# Patient Record
Sex: MALE | Race: WHITE | Employment: FULL TIME
[De-identification: names, ages, dates, MRNs, and addresses within clinical notes are randomized per-mention and may not be internally consistent; named-entity substitution may affect disease eponyms.]

---

## 2022-07-14 ENCOUNTER — TRANSCRIBE ORDERS (OUTPATIENT)
Dept: OTHER | Age: 42
End: 2022-07-14

## 2022-07-14 DIAGNOSIS — F41.9 ANXIETY AND DEPRESSION: Primary | ICD-10-CM

## 2022-07-14 DIAGNOSIS — F32.A ANXIETY AND DEPRESSION: Primary | ICD-10-CM

## 2022-07-15 ENCOUNTER — TELEPHONE (OUTPATIENT)
Dept: NEUROPSYCHOLOGY | Facility: CLINIC | Age: 42
End: 2022-07-15

## 2022-07-15 NOTE — TELEPHONE ENCOUNTER
Spoke with Soledad at [facility].  Left message for ref prov that neuropsych referral is declined. Unfortunately, at this time our clinic does not see patients for diagnostic questions related to learning disabilities, ADHD, and/or autism spectrum disorders. In short, we do not currently perform assessments solely for the retrospective diagnosis of developmental conditions in adults.     Provided ref prov with the following alternative referral options:    - Dr. Karena [email or web address]  [phone]    - [facility] - with locations throughout the [facility] area: [phone].    - [facility] - with locations throughout the [facility] area: [phone].    - [facility] - with locations throughout the [facility] area: [phone].    Mavis Irvin  Psychometrist

## 2025-01-09 ENCOUNTER — TELEPHONE (OUTPATIENT)
Dept: UROLOGY | Facility: CLINIC | Age: 45
End: 2025-01-09

## 2025-01-09 NOTE — TELEPHONE ENCOUNTER
[facility] Call Center    Phone Message    May a detailed message be left on voicemail:Yes     Reason for Call: Other: Patient called to schedule an appointment for anorgasmia. However writer does not see DX on protocols. Please review and call patient back to schedule.     Action Taken: Message routed to:  [facility] Urology    Travel Screening: Not Applicable     Date of Service:

## 2025-01-15 NOTE — TELEPHONE ENCOUNTER
Spoke with patient. Patient needs some time to think about it as per his insurance, each provider had different co pay amounts. Patient will call back if wanting to schedule with us.

## 2025-01-30 NOTE — TELEPHONE ENCOUNTER
MEDICAL RECORDS REQUEST   [facility]  Urology Clinic  [address] [place] [address]  PHONE: [phone]  Fax: [phone]        FUTURE VISIT INFORMATION                                                   Jatin Huang, : 1980 scheduled for future visit at [facility]    APPOINTMENT INFORMATION:  Date: 3/6/25  Provider:  Abhi Hein MD   Reason for Visit/Diagnosis: anorgasmia     REFERRAL INFORMATION:  Referring provider:  Self Referred    RECORDS REQUESTED FOR VISIT                                                     NOTES  STATUS/DETAILS   OFFICE NOTE from other specialist  yes 10/8/24, 24 - Mauro Cervantes, NP - [facility]    MEDICATION LIST  yes     PRE-VISIT CHECKLIST      Joint diagnostic appointment coordinated correctly          (ensure right order & amount of time) Yes   RECORD COLLECTION COMPLETE Yes and No

## 2025-02-12 ENCOUNTER — HOSPITAL ENCOUNTER (EMERGENCY)
Facility: CLINIC | Age: 45
Discharge: HOME | End: 2025-02-12
Attending: SOCIAL WORKER | Admitting: SOCIAL WORKER
Payer: COMMERCIAL

## 2025-02-12 VITALS
HEART RATE: 83 BPM | DIASTOLIC BLOOD PRESSURE: 92 MMHG | OXYGEN SATURATION: 100 % | TEMPERATURE: 98.3 F | WEIGHT: 200.8 LBS | HEIGHT: 72 IN | SYSTOLIC BLOOD PRESSURE: 134 MMHG | RESPIRATION RATE: 16 BRPM | BODY MASS INDEX: 27.2 KG/M2

## 2025-02-12 DIAGNOSIS — F41.1 GAD (GENERALIZED ANXIETY DISORDER): ICD-10-CM

## 2025-02-12 DIAGNOSIS — F34.1 PERSISTENT DEPRESSIVE DISORDER: ICD-10-CM

## 2025-02-12 DIAGNOSIS — F33.1 MAJOR DEPRESSIVE DISORDER, RECURRENT EPISODE, MODERATE (H): ICD-10-CM

## 2025-02-12 PROBLEM — F33.2 SEVERE EPISODE OF RECURRENT MAJOR DEPRESSIVE DISORDER, WITHOUT PSYCHOTIC FEATURES (H): Status: ACTIVE | Noted: 2025-02-12

## 2025-02-12 PROCEDURE — 99283 EMERGENCY DEPT VISIT LOW MDM: CPT

## 2025-02-12 RX ORDER — EMTRICITABINE AND TENOFOVIR DISOPROXIL FUMARATE 200; 300 MG/1; MG/1
1 TABLET, FILM COATED ORAL DAILY
COMMUNITY
Start: 2024-10-15

## 2025-02-12 ASSESSMENT — ACTIVITIES OF DAILY LIVING (ADL)
ADLS_ACUITY_SCORE: 41

## 2025-02-12 ASSESSMENT — COLUMBIA-SUICIDE SEVERITY RATING SCALE - C-SSRS
2. HAVE YOU ACTUALLY HAD ANY THOUGHTS OF KILLING YOURSELF IN THE PAST MONTH?: YES
4. HAVE YOU HAD THESE THOUGHTS AND HAD SOME INTENTION OF ACTING ON THEM?: YES
6. HAVE YOU EVER DONE ANYTHING, STARTED TO DO ANYTHING, OR PREPARED TO DO ANYTHING TO END YOUR LIFE?: NO
3. HAVE YOU BEEN THINKING ABOUT HOW YOU MIGHT KILL YOURSELF?: YES
1. IN THE PAST MONTH, HAVE YOU WISHED YOU WERE DEAD OR WISHED YOU COULD GO TO SLEEP AND NOT WAKE UP?: YES
5. HAVE YOU STARTED TO WORK OUT OR WORKED OUT THE DETAILS OF HOW TO KILL YOURSELF? DO YOU INTEND TO CARRY OUT THIS PLAN?: YES

## 2025-02-12 NOTE — ED TRIAGE NOTES
Patient presents to the ER looking to go to EMPATH unit. Patient reports that he has had depression for the last 20 years but over the last couple of days has had increasing suicidal ideation, with a plan in place. Wants to go to EMPATH and contracted for safety while waiting to go      Triage Assessment (Adult)       Row Name 02/12/25 1415          Triage Assessment    Airway WDL WDL        Respiratory WDL    Respiratory WDL WDL        Skin Circulation/Temperature WDL    Skin Circulation/Temperature WDL WDL        Cardiac WDL    Cardiac WDL WDL        Peripheral/Neurovascular WDL    Peripheral Neurovascular WDL WDL        Cognitive/Neuro/Behavioral WDL    Cognitive/Neuro/Behavioral WDL X

## 2025-02-12 NOTE — PROGRESS NOTES
"44 year old male with history of depression received from ED due to increased depression and SI. Reports his SI is chronic. Has been ongoing for years, but was really bad today. Cannot identify any specific trigger. States he always has multiple plans. States he has never attempted to take his life, \"but if I did I would have been successful.\" States he has tried \"almost every antianxiety and antidepressant medication there is\" and nothing has helped. States he most recently was on Effexor and he had bad withdrawal side effects from it. States he has a history of opiate and heroine addiction, so avoids most medications due to worry of addiction and withdrawal symptoms. States for that reason, he is not open to starting or trying any medications while here. Is hoping to get a referral for ECT treatment or alternative treatment option. Denies drug or alcohol use. Denies experiencing hallucinations. Flat affect, and slightly irritable in mood, but overall cooperative with intake process.     Nursing and risk assessments completed. Assessments reviewed with LMHP and physician. Admission information reviewed with patient. Patient given a tour of [facility] and instructions on using the facility. Questions regarding [facility] addressed. Pt safety search completed.     "

## 2025-02-12 NOTE — ED NOTES
[facility]  ED to EMPATH Checklist:      Goal for EMPATH: Suicidality    Current Behavior: Calm and Cooperative    Safety Concerns: Suicidal, with a plan to    Legal Hold Status: Voluntary    Medically Cleared by ED provider: Yes    Patient Therapeutically Searched: Not searched - Currently in triage    Belongings: Remain with patient    Independent Ambulation at Baseline: Yes/No: Yes    Participates in Care/Conversation: Yes/No: Yes    Patient Informed about EMPATH: Yes/No: Yes    DEC: Ordered and pending    Patient Ready to be Transferred to EMPATH? Yes/No: Yes

## 2025-02-13 ENCOUNTER — VIRTUAL VISIT (OUTPATIENT)
Dept: PSYCHOLOGY | Facility: CLINIC | Age: 45
End: 2025-02-13
Payer: COMMERCIAL

## 2025-02-13 DIAGNOSIS — Z78.9 KNOWN HEALTH PROBLEMS: NONE: Primary | ICD-10-CM

## 2025-02-13 NOTE — ED PROVIDER NOTES
[facility] Unit - Psychiatric Consultation  [facility] Emergency Department    Jatin Huang MRN: [id number]   Age: 44 year old YOB: 1980     History     Chief Complaint   Patient presents with    Psychiatric Evaluation     HPI  Jatin Huang is a 44 year old male with history notable for depression, anxiety, and remote history of polysubstance dependence.  Patient presented to the emergency department for evaluation of worsening depression accompanied by suicidal ideation.  Patient was medically evaluated in the emergency department and determined to be medically stable for transfer to [facility] for further psychiatric assessment.  Patient is nearing 5.5 hours in emergency care.  Here at [facility], patient reports he has been struggling with depression for many years, consistent with the persistent depressive disorder.  He does have periods where he feels more significantly depressed beyond his baseline depression, accompanied by low energy, poor motivation, social withdrawal and isolation, hopelessness, and increased suicidal thinking.  He reports that he struggles with impulse control and anger outbursts, sometimes entering into verbal altercations at work or punching and kicking walls.  He has tried a variety of antidepressants, Abilify, TMS, and DBT, but has not found an effective treatment for his depression and impulsivity.  He recently has been looking into electroconvulsive therapy and is seeking a referral.  He is aware of the side effects and memory impairment associated with this.  In the interim, he was agreeable to a discussion regarding a couple of antidepressants he has not tried.  We discussed Trintellix as an option, and he was accepting of a prescription for this.  He is open to intensive outpatient therapy, stating he recently stopped attending therapy a couple of weeks ago.  Currently, he denies any suicidal plan or intent.  He experiences passive suicidal thinking at times, but feels  confident in his ability to reach out for help.  He is help seeking and agrees to return to the ED with any worsening symptoms.    Past Medical History  No past medical history on file.  No past surgical history on file.  emtricitabine-tenofovir (TRUVADA) 200-300 MG per tablet  vortioxetine (TRINTELLIX) 10 MG tablet      No Known Allergies  Family History  No family history on file.  Social History           Review of Systems  A medically appropriate review of systems was performed with pertinent positives and negatives noted in the HPI, and all other systems negative.    Physical Examination   BP: (!) 157/98  Pulse: 82  Temp: 98.7  F (37.1  C)  Resp: 20  Height: 182.9 cm (6')  Weight: 91.1 kg (200 lb 12.8 oz)  SpO2: 99 %    Physical Exam  General: Appears stated age.   Neuro: Alert and fully oriented. Extremities appear to demonstrate normal strength on visual inspection.   Integumentary/Skin: no rash visualized, normal color    Psychiatric Examination   Appearance: awake, alert, adequately groomed, appeared as age stated, and casually dressed  Attitude:  cooperative  Eye Contact:  good  Mood:  depressed  Affect:  mood congruent and intensity is blunted  Speech:  clear, coherent and normal prosody  Psychomotor Behavior:  no evidence of tardive dyskinesia, dystonia, or tics  Thought Process:  linear  Associations:  no loose associations  Thought Content:  no evidence of psychotic thought, passive suicidal ideation present, no auditory hallucinations present, and no visual hallucinations present  Insight:  fair  Judgement:  fair  Oriented to:  time, person, and place  Attention Span and Concentration:  fair  Recent and Remote Memory:  fair  Language: able to name/identify objects without impairment  Fund of Knowledge: intact with awareness of current and past events    ED Course        Labs Ordered and Resulted from Time of ED Arrival to Time of ED Departure - No data to display    Assessments & Plan (with Medical  Decision Making)   Patient presenting with symptoms of depression accompanied by suicidal thinking, seeking a referral for ECT treatment.  He is amenable to a trial of Trintellix after discussion, and will be set up with outpatient resources.  Nursing notes reviewed noting no acute issues.     I have reviewed the assessment completed by the [facility].     Preliminary diagnosis:    ICD-10-CM    1. Persistent depressive disorder  F34.1       2. Major depressive disorder, recurrent episode, moderate (H)  F33.1       3. VELMA (generalized anxiety disorder)  F41.1            Treatment Plan:  -Start Trintellix 10 mg daily for treatment of anxiety and depressive symptoms.  Provided education on mechanism of action.  -Patient declines any as needed medication  -Patient is seeking outpatient ECT and has been in contact with the [facility] ECT clinic.  He is seeking a referral and will be provided with an outpatient psychiatry appointment, where he can receive a referral for ECT.  He will also be provided with information to schedule an appointment with the treatment resistant depression clinic in [place].  -Patient will be scheduled for an intake appointment for programmatic care  -Patient able to contract for safety and is seeking discharge home at this time.  Patient to be discharged      After a period of working with the treatment team on the [facility] unit, the patient's mental state improved to allow a safe transition to outpatient care. After counseling on the diagnosis, work-up, and treatment plan, the patient was discharged. Close follow-up with a psychiatrist and/or therapist was recommended and community psychiatric resources were provided. Patient is to return to the ED if any urgent or potentially life-threatening concerns.     At the time of discharge, the patient's acute suicide risk was determined to be low due to the following factors: Reduction in the intensity of mood/anxiety symptoms that preceded  the admission, denial of suicidal thoughts, denies feeling helpless or hopeless, not currently under the influence of alcohol or illicit substances, denies experiencing command hallucinations, no immediate access to firearms. The patient's acute risk could be higher if noncompliant with their treatment plan, medications, follow-up appointments or using illicit substances or alcohol. Protective factors include: social supports, stable housing, employment      --  Edgar Frohreich, CNP   [facility] EMERGENCY DEPT  Em[facility] Unit      Edgar Lamar CNP  02/12/25 3620

## 2025-02-13 NOTE — DISCHARGE INSTRUCTIONS
Upcoming Appointments    Type: Telepsychiatry  Date: Wednesday, 2/26/2025    Time: 9:30 am - 10:30 am  Provider: Celena Valdez  MSN  CNP,PMANDERSP,RN  Location: [facility], [facility] [place] [address]  Phone: [phone]    Patient instructions  Please complete New Patient Form by using following link: [email or web address]. All forms need to be completed 24 hours prior to the appointment date/time by going to [email or web address] Audio. Please call us on  [phone]  24 hours prior to your scheduled appointment to confirm that you plan to attend. We will provide you information about how to log into video call when you call.[phone]    Patient Navigation Hub - Scheduled Appointment    You have been scheduled a telephone appointment with the Mental Health and Addiction Services Patient Navigation Hub. As a reminder, this is not an in-person appointment. A Navigator will contact you at your personal telephone number on 02/13/25 at 2pm. You can expect a 15-30 minute appointment. You will discuss programming options and be assisted in next steps. If you have any further questions or concerns, please contact the Navigation Hub at [phone]. Note: You will not be charged for this telephone appointment.    Our Navigators work to be your point-of-contact for trustworthy and compassionate care from Emergency Services to [facility]. We will provide resources and communication to help guide you into programmatic care, other internal resources (I.e., Transition Clinic), and/or community programs. Ultimately, our goal is to be the one-stop-shop of communication, coordination, and support for you.    Phone: [phone]  Email: [email or web address]  Fax: [phone]    [facility]  The following is a list of our programming options that may fit your next steps:    Programs  Mental Health  Intensive  Outpatient Program (IOP)  Partial Hospitalization Program (PHP)  Day Treatment  Substance Use Disorder  Intensive Outpatient Program  Outpatient Group  Mental Health & Substance Use Disorder  Co-Occurring Intensive Outpatient Program  Residential  Available Locations  [facility], [facility], [place], [place], [place]  Note: Specific program options vary by location.    Transition Clinic  After leaving Emergency Services, it may take up to 30 days to enter a program. Knowing support is important during this period of time, we offer an urgent model of mental health care via the Transition Clinic. We encourage you to discuss this with your Navigator during your telephone appointment.    FAQ  What can I expect after leaving Emergency Services?  If not already, you will soon be contacted by a Navigator who will work with you to find a program that fits your needs. If you desire to enter one of our [facility] programs, you will enter our programmatic care intake where an assessment will likely be needed over telephone, virtually, or in-person. After this assessment, a Navigator will connect with you again to provide a handoff to the specific program for next steps.   Please note that it may take up to 30 days for you to begin a program. If an extended wait occurs, please consider services at the Transition Clinic.  What is programmatic care?  It is a highly structured and comprehensive approach designed to address mental health and substance use disorders.   Programmatic care is typically used when individuals have not responded well to less intensive treatments or when they require a higher level of intervention due to the severity of their condition. It can be found in various settings, such as an outpatient location, residential treatment facilities, or inpatient hospitals.  Each program varies in duration and weekly commitments. Ask a Navigator for more program details.    "          Aftercare Plan  If I am feeling unsafe or I am in a crisis, I will:   Contact my established care providers   Call the National Suicide Prevention Lifeline: 988  Go to the nearest emergency room   Call 911       Crisis Lines  Crisis Text Line  Text 305652  You will be connected with a trained live crisis counselor to provide support.    Por espanol, texto  CHARLOTTE a 486871 o texto a 442-AYUDAME en WhatsApp    [employer] (LGBTQ Youth Crisis Line)  7.881.146.4542  text START to 533-171      Community GodTube  Fast Tracker  Linking people to mental health and substance use disorder resources  Playtabase.RadarChile     [place] Mental Health Warm Line  Peer to peer support  Monday thru Saturday, 12 pm to 10 pm  992.411.7195 or 4.129.230.4905  Text \"Support\" to 67273    National [place] on Mental Illness (JANELLE)  265.266.0283 or 1.888.JANELLE.HELPS      Mental Health Apps  My3  https://CoverItLive.org/    VirtualHopeBox  https://Clozeorg/apps/virtual-hope-box/      Additional Information  Today you were seen by a licensed mental health professional through Triage and Transition services, Behavioral Healthcare Providers ([facility])  for a crisis assessment in the Emergency Department at [facility].  It is recommended that you follow up with your established providers (psychiatrist, mental health therapist, and/or primary care doctor - as relevant) as soon as possible. Coordinators from [facility] will be calling you in the next 24-48 hours to ensure that you have the resources you need.  You can also contact [facility] coordinators directly at [phone]. You may have been scheduled for or offered an appointment with a mental health provider. [facility] maintains an extensive network of licensed behavioral health providers to connect patients with the services they need.  We do not charge providers a fee to participate in our referral network.  We match patients with providers based on a patient's specific needs, " insurance coverage, and location.  Our first effort will be to refer you to a provider within your care system, and will utilize providers outside your care system as needed.          [facility] Support Groups/Resources:  Website: https://[facility].org/support/support-groups-for-adults-living-with-a-mental-illness/  Location(s): [facility], [place], [facility], [place], [place], & [facility].   Phone: [phone]) [phone]  Email: [email or web address]  About: JANELLE Cabraels is a support group for adults living with mental illness. Groups are free and meet for 90 minutes. All groups are led by trained facilitators who also live with mental illness. Groups provide a welcoming, safe, judgment-free space to share challenges, successes, and learn from one another. There are also groups that focus on LGBTQ+ and BI-POC identities.       [employer]  Website: https://Blackbay.AudioTag/  Phone: (154) 507-4650        Self Compassion Meditation:  Identify thought and label as negative as it occurs  Come up with alternative more-neutral thought   Meditate with alternative neutral thought        Treatment Resistant Depression Clinic  https://[email or web address]/Treatment-[email or web address]-Depression  # 1-019-UDMLTYYW  ([phone])

## 2025-02-13 NOTE — PLAN OF CARE
"Jatin Huang  February 12, 2025  Plan of Care Hand-off Note     Patient Recommended Care Path: discharge    Clinical Substantiation:  Pt is recommended for discharge with disposition for OP psychiatry and programmatic care. Pt presents to ED by self due to concerns from increased anxiety, depression and suicidal ideation. Pt reports MH sx have been present since adolesence with chronic depression. Pt reports becoming overwhelmed at work earlier today which resulted in fight/flight mode where he ended up leaving; pt reports the emotional dysregulation resulted in increased impulsivity and SI so pt researched Em[facility] unit and came to ED. Pt is coming to ED seeking referrals for ECT and programmatic care. Pt reports SI but denies specific plans, urges or intent. Pt reports SI is \"chronic\" and shifts in intensity based on emotions and situations. Pt reports he has thought of methods in the past but not currently planned or completed prepatory acts for suicide. Pt denies SIB and HI. Pt denies AVH and other psychotic sx. Pt is not followed by OP therapist or OP psychiatrist. Pt is not currently taking psychiatric medications. Pt denies substance use concerns.  After meeting with LMHP and provider pt reports readiness to discharge. Pt is requesting referrals for OP psychiatry to support ECT process and for medication management; moreover, pt requested referral for programmatic care call. Pt's depression and SI are chronic and may not resolve from higher levels of care; pt is future oriented and goal directed in his help seeking behaviors. Pt engaged in safety planning by identifying early warning signs, coping skills and external supports. Referrals and resources included in AVS. Pt and provider are agreeable to discharge care path. No further LMHP tasks needed at this time.    Goals for crisis stabilization:  referrals for OP supports    Next steps for Care Team:  referrsals completed; no further tasks at this " time    Treatment Objectives Addressed:  safety planning, processing feelings, identifying and practicing coping strategies, identifying an appropriate aftercare plan, assessing safety, identifying treatment goals    Therapeutic Interventions:  Engaged in safety planning, Engaged in cognitive restructuring/ reframing, looked at common cognitive distortions and challenged negative thoughts., Engaged in guided discovery, explored patient's perspectives and helped expand them through socratic dialogue.    Has a specific means been identified for suicidal.homicide actions: No       Patient coping skills attempted to reduce the crisis:  help seeking                           Collateral contact information:   N/A    Legal Status: Voluntary/Patient has signed consent for treatment                                                                                                                                 Reviewed court records: yes     Psychiatry Consult: completed prior to discharge    LAURA Blair

## 2025-02-13 NOTE — CONSULTS
"Diagnostic Evaluation Consultation  Crisis Assessment    Patient Name: Jatin Huang  Age:  44 year old  Legal Sex: male  Gender Identity: male  Pronouns: he/him  Race: Choose not to Answer  Ethnicity: Choose not to answer  Language: English      Patient was assessed: In person   Crisis Assessment Start Date: 02/12/25  Crisis Assessment Start Time: 1710  Crisis Assessment Stop Time: 1810  Patient location: [facility] Emergency Dept                             [id number]    Referral Data and Chief Complaint  Jatin Huang presents to the ED by  self. Patient is presenting to the ED for the following concerns: Anxiety, Depression, Suicidal ideation. Factors that make the mental health crisis life threatening or complex are: Pt presents to ED by self due to concerns from increased anxiety, depression and suicidal ideation. Pt reports MH sx have been present since adolesence with chronic depression. Pt reports becoming overwhelmed at work earlier today which resulted in fight/flight mode where he ended up leaving; pt reports the emotional dysregulation resulted in increased impulsivity and SI so pt researched [facility] unit and came to ED. Pt is coming to ED seeking referrals for ECT and programmatic care. Pt reports SI but denies specific plans, urges or intent. Pt reports SI is \"chronic\" and shifts in intensity based on emotions and situations. Pt reports he has thought of methods in the past but not currently planned or completed prepatory acts for suicide. Pt denies SIB and HI. Pt denies AVH and other psychotic sx. Pt is not followed by OP therapist or OP psychiatrist. Pt is not currently taking psychiatric medications. Pt denies substance use concerns..      Informed Consent and Assessment Methods  Explained the crisis assessment process, including applicable information disclosures and limits to confidentiality, assessed understanding of the process, and obtained consent to proceed with the " assessment.  Assessment methods included conducting a formal interview with patient, review of medical records, collaboration with medical staff, and obtaining relevant collateral information from family and community providers when available.  : done       History of the Crisis   Pt is a 44 year old male that presents for DEC assessment as calm and cooperative. Pt reports prior dx of PDD, VELMA, and borderline personality disorder. Pt denies hx of prior suicide attempts. Pt denies hx of IPMH hospitalizations. Pt reports hx of MH OP tx including OP therapy, medication management, completing 9 months of DBT and TMS trial. Pt reports terminating OP therapy last week. Pt reports hx of prior medication trials but reports medications have never been helpful in mitigating sx severity. Pt reports hx of substance use with prior heroin/opiate addiction; however, pt has been sober for 10 years. Pt reports family hx of bipolar disorder and schizophrenia. Pt denies trauma/abuse hx. Pt reports strong social supports with family and friends.      Brief Psychosocial History  Family:  Single, Children no  Support System:  Parent(s), Sibling(s), Friend  Employment Status:  employed full-time  Source of Income:  salary/wages  Financial Environmental Concerns:  none  Current Hobbies:  games, music, group/social activities  Barriers in Personal Life:  emotional concerns      Significant Clinical History  Current Anxiety Symptoms:  anxious, excessive worry, racing thoughts  Current Depression/Trauma:  apathy, crying or feels like crying, irritable, helplessness, hopelessness, sadness, thoughts of death/suicide  Current Somatic Symptoms:  anxious, excessive worry, racing thoughts  Current Psychosis/Thought Disturbance:  impulsive  Current Eating Symptoms:     Chemical Use History:  Alcohol: None  Benzodiazepines: None  Opiates: None  Cocaine: None  Marijuana: Occasional  Other Use: None   Past diagnosis:  Anxiety Disorder, Depression,  Personality Disorder  Family history:  Schizophrenia, Bipolar Disorder  Past treatment:  Individual therapy, Primary Care, Psychiatric Medication Management, Day Treatment  Details of most recent treatment:  Pt terminated OP therapy last week  Other relevant history:       Have there been any medication changes in the past two weeks:  patient is not on psychiatric meds       Is the patient compliant with medications:             Collateral Information  Is there collateral information: No          Risk Assessment  [place] Suicide Severity Rating Scale Full Clinical Version:  Suicidal Ideation  Q1 Wish to be Dead (Lifetime): Yes  Q2 Non-Specific Active Suicidal Thoughts (Lifetime): Yes  3. Active Suicidal Ideation with any Methods (Not Plan) Without Intent to Act (Lifetime): Yes  4. Active Suicidal Ideation with Some Intent to Act, Without Specific Plan (Lifetime): Yes  5. Active Suicidal Ideation with Specific Plan and Intent (Lifetime): No  Q6 Suicide Behavior (Lifetime): no     Suicidal Behavior (Lifetime)  Actual Attempt (Lifetime): No  Has subject engaged in non-suicidal self-injurious behavior? (Lifetime): No  Interrupted Attempts (Lifetime): No  Aborted or Self-Interrupted Attempt (Lifetime): No  Preparatory Acts or Behavior (Lifetime): No    [place] Suicide Severity Rating Scale Recent:   Suicidal Ideation (Recent)  Q1 Wished to be Dead (Past Month): yes  Q2 Suicidal Thoughts (Past Month): yes  Q3 Suicidal Thought Method: yes  Q4 Suicidal Intent without Specific Plan: no  Q5 Suicide Intent with Specific Plan: no  Level of Risk per Screen: moderate risk     Suicidal Behavior (Recent)  Actual Attempt (Past 3 Months): No  Has subject engaged in non-suicidal self-injurious behavior? (Past 3 Months): No  Interrupted Attempts (Past 3 Months): No  Aborted or Self-Interrupted Attempt (Past 3 Months): No  Preparatory Acts or Behavior (Past 3 Months): No    Environmental or Psychosocial Events:  impulsivity/recklessness, helplessness/hopelessness  Protective Factors: Protective Factors: strong bond to family unit, community support, or employment, responsibilities and duties to others, including pets and children, lives in a responsibly safe and stable environment, good treatment engagement, able to access care without barriers, help seeking, good problem-solving, coping, and conflict resolution skills    Does the patient have thoughts of harming others? Feels Like Hurting Others: no  Previous Attempt to Hurt Others: no  Is the patient engaging in sexually inappropriate behavior?: no  Does Patient have a known history of aggressive behavior: No    Is the patient engaging in sexually inappropriate behavior?  no          Mental Status Exam   Affect: Appropriate  Appearance: Appropriate  Attention Span/Concentration: Attentive  Eye Contact: Engaged    Fund of Knowledge: Appropriate   Language /Speech Content: Fluent  Language /Speech Volume: Normal  Language /Speech Rate/Productions: Normal  Recent Memory: Intact  Remote Memory: Intact  Mood: Normal  Orientation to Person: Yes   Orientation to Place: Yes  Orientation to Time of Day: Yes  Orientation to Date: Yes     Situation (Do they understand why they are here?): Yes  Psychomotor Behavior: Normal  Thought Content: Suicidal  Thought Form: Goal Directed, Intact           Medication  Psychotropic medications:   Medication Orders - Psychiatric (From admission, onward)      None             Current Care Team  Patient Care Team:  Mauro Cervantes NP as PCP - General    Diagnosis  Patient Active Problem List   Diagnosis Code    Severe episode of recurrent major depressive disorder, without psychotic features (H) F33.2       Primary Problem This Admission  Active Hospital Problems    Severe episode of recurrent major depressive disorder, without psychotic features (H)        Clinical Summary and Substantiation of Recommendations   Clinical Substantiation:  Pt is  "recommended for discharge with disposition for OP psychiatry and programmatic care. Pt presents to ED by self due to concerns from increased anxiety, depression and suicidal ideation. Pt reports MH sx have been present since adolesence with chronic depression. Pt reports becoming overwhelmed at work earlier today which resulted in fight/flight mode where he ended up leaving; pt reports the emotional dysregulation resulted in increased impulsivity and SI so pt researched [facility] unit and came to ED. Pt is coming to ED seeking referrals for ECT and programmatic care. Pt reports SI but denies specific plans, urges or intent. Pt reports SI is \"chronic\" and shifts in intensity based on emotions and situations. Pt reports he has thought of methods in the past but not currently planned or completed prepatory acts for suicide. Pt denies SIB and HI. Pt denies AVH and other psychotic sx. Pt is not followed by OP therapist or OP psychiatrist. Pt is not currently taking psychiatric medications. Pt denies substance use concerns.  After meeting with LMHP and provider pt reports readiness to discharge. Pt is requesting referrals for OP psychiatry to support ECT process and for medication management; moreover, pt requested referral for programmatic care call. Pt's depression and SI are chronic and may not resolve from higher levels of care; pt is future oriented and goal directed in his help seeking behaviors. Pt engaged in safety planning by identifying early warning signs, coping skills and external supports. Referrals and resources included in AVS. Pt and provider are agreeable to discharge care path. No further [facility] tasks needed at this time.    Goals for crisis stabilization:  referrals for OP supports    Next steps for Care Team:  referrsals completed; no further tasks at this time    Treatment Objectives Addressed:  safety planning, processing feelings, identifying and practicing coping strategies, identifying an appropriate " aftercare plan, assessing safety, identifying treatment goals    Therapeutic Interventions:  Engaged in safety planning, Engaged in cognitive restructuring/ reframing, looked at common cognitive distortions and challenged negative thoughts., Engaged in guided discovery, explored patient's perspectives and helped expand them through socratic dialogue.    Has a specific means been identified for suicidal/homicide actions: No          Disposition  Recommended referrals: Medication Management, Individual Therapy, Programmatic Care        Reviewed case and recommendations with attending provider. Attending Name: Bharat Lamar       Attending concurs with disposition: yes       Patient and/or validated legal guardian concurs with disposition:   yes       Final disposition:  discharge                            Legal status: Voluntary/Patient has signed consent for treatment                                                                                                                                 Reviewed court records: yes       Assessment Details   Total duration spent with the patient: 60 min     CPT code(s) utilized: 68190 - Psychotherapy for Crisis - 60 (30-74*) min    Max LAURA Nair, Psychotherapist  DEC - Triage & Transition Services  Callback: [phone]

## 2025-02-13 NOTE — PROGRESS NOTES
Patient agreeable to discharge plan. Discharge instructions reviewed with patient including follow-up care plan. Reviewed safety plan and outpatient resources. Denies SI and HI. All belongings that were brought into the hospital have been returned to patient. Escorted off the unit at 1942 accompanied by [facility] staff. Discharged to home via self.

## 2025-02-13 NOTE — PROGRESS NOTES
Navigator Phone Call     PATIENT'S NAME: Jatin Huang  MRN:                      [id number]  :           1980    DATE OF SERVICE: 25    Was the patient reached? Yes    Does that patient have MyChart? Pending    Was the insurance grid consulted? No  Mixed insurance, in network, out of network:     Appointment scheduled with Lead [facility]: No    If still interested in programmatic care was a crisis therapy Transition Clinic appointment offered for bridging until programmatic care starts? No    Additional Notes: Pt called the [facility]endra hub at noon today and said that 2pm wouldn't work for the phone call and said he wasn't consulted on the time while in [facility]. Pt had questions about IOP/PHP, said he didn't get help with his MH symptoms while in DBT and doesn't want to waste time if IOP/PHP is the same as DBT. Pt's main concern was ECT, writer looked in the chart and didn't see any orders for that (pt asked to be connected to staff at [facility] to take care of that and writer said that she would take care of it). Told pt writer would do some research on how to get him connected to that service and would email pt with plan/next steps. Also informed pt that he would not be able to ECT and IOP/PHP at the same time, pt said he was more interested in ECT at this time.   Writer placed 9035 order for ECT. Writer emailed pt (confirmed email while on the phone) about ECT next steps and IOP programming.

## 2025-02-14 ENCOUNTER — TELEPHONE (OUTPATIENT)
Dept: BEHAVIORAL HEALTH | Facility: CLINIC | Age: 45
End: 2025-02-14
Payer: COMMERCIAL

## 2025-02-14 NOTE — TELEPHONE ENCOUNTER
Pt had emailed this writer back stating:  Thanks so much for the follow up. IOP might be the better option for me to try first. What are the next steps as far as scheduling and figuring out how much this is going to cost me out of pocket?     Writer replied:  To move forward with IOP, we would just need to get you scheduled for a Diagnostic Assessment. They are completed virtually, and we have availability at 8am Mon-Fri, 8:30am Thurs and Fri, 11:30am Mon-Fri and 1:30pm Mon-Fri.   If any of those times work for you, I can get you scheduled. And then I can also send you a link to activate [employer] as there are some forms to fill out ahead of time.   Feel free to call or email if that works for you.     Pt called to discuss options - pt upset that writer didn't know about cost of services at time of call but writer said that she would get that taken care of. Pt then asked about times of programming and writer relayed the times, pt upset about not having options in the evening and having to figure it out with work and financially. Pt upset that programs, providers and insurance does not all communicate, that everyone is in their own area. Pt said it would be  easier to obtain a gun and end it all . Pt wanted to move forward with DA scheduling, upset there are no evenings or weekends. Pt abruptly ended the call and said  this won't work for me, bye forever . Writer reached out to leaders and colleagues about call, Leonardo Marks reported the pt should have a welfare check. Pt then called back stating he didn't want writer to call the police and have them come, as he  didn't want to shoot a  . Pt said he was sorry for ending the previous call abruptly, that he is  upset with the system . While looking for options for pt, we could not find times that work for pt's schedule and writer said she would see if there were evening appts and let pt know today. Writer also offered a referral to [facility] to find programming  outside of [place] and pt stated  I've lived in [place] for 9 years; I know all the programs.'  After ending the call, writer consulted with Ko Urena M.S., [facility], about all the statements pt provided  easier to end it all, easier to obtain a gun and end it, didn't want to shoot a  , Ko said that a welfare call should be made and that pt has escalated and may not be appropriate for further programming until pt is reevaluated. Writer then made call to [place] non-emergency for welfare check, incident number [phone].    After welfare call was made, debriefed with MSW Supervisor EDGARDO Peters, LISCW.

## 2025-02-17 ENCOUNTER — TELEPHONE (OUTPATIENT)
Dept: BEHAVIORAL HEALTH | Facility: CLINIC | Age: 45
End: 2025-02-17
Payer: COMMERCIAL

## 2025-02-17 ENCOUNTER — HOSPITAL ENCOUNTER (OUTPATIENT)
Facility: CLINIC | Age: 45
Setting detail: OBSERVATION
Discharge: HOME | End: 2025-02-18
Attending: EMERGENCY MEDICINE | Admitting: PSYCHIATRY & NEUROLOGY
Payer: COMMERCIAL

## 2025-02-17 DIAGNOSIS — R45.851 SUICIDAL IDEATION: ICD-10-CM

## 2025-02-17 DIAGNOSIS — F33.2 SEVERE EPISODE OF RECURRENT MAJOR DEPRESSIVE DISORDER, WITHOUT PSYCHOTIC FEATURES (H): ICD-10-CM

## 2025-02-17 LAB
ALBUMIN SERPL BCG-MCNC: 4.2 G/DL (ref 3.5–5.2)
ALP SERPL-CCNC: 81 U/L (ref 40–150)
ALT SERPL W P-5'-P-CCNC: 41 U/L (ref 0–70)
AMPHETAMINES UR QL SCN: ABNORMAL
ANION GAP SERPL CALCULATED.3IONS-SCNC: 11 MMOL/L (ref 7–15)
AST SERPL W P-5'-P-CCNC: 32 U/L (ref 0–45)
BARBITURATES UR QL SCN: ABNORMAL
BASOPHILS # BLD AUTO: 0.1 10E3/UL (ref 0–0.2)
BASOPHILS NFR BLD AUTO: 1 %
BENZODIAZ UR QL SCN: ABNORMAL
BILIRUB SERPL-MCNC: 0.4 MG/DL
BUN SERPL-MCNC: 12.2 MG/DL (ref 6–20)
BZE UR QL SCN: ABNORMAL
CALCIUM SERPL-MCNC: 8.9 MG/DL (ref 8.8–10.4)
CANNABINOIDS UR QL SCN: ABNORMAL
CHLORIDE SERPL-SCNC: 101 MMOL/L (ref 98–107)
CREAT SERPL-MCNC: 1.06 MG/DL (ref 0.67–1.17)
EGFRCR SERPLBLD CKD-EPI 2021: 90 ML/MIN/1.73M2
EOSINOPHIL # BLD AUTO: 0.2 10E3/UL (ref 0–0.7)
EOSINOPHIL NFR BLD AUTO: 2 %
ERYTHROCYTE [DISTWIDTH] IN BLOOD BY AUTOMATED COUNT: 12.5 % (ref 10–15)
FENTANYL UR QL: ABNORMAL
GLUCOSE SERPL-MCNC: 103 MG/DL (ref 70–99)
HCO3 SERPL-SCNC: 27 MMOL/L (ref 22–29)
HCT VFR BLD AUTO: 42.7 % (ref 40–53)
HGB BLD-MCNC: 14.5 G/DL (ref 13.3–17.7)
IMM GRANULOCYTES # BLD: 0 10E3/UL
IMM GRANULOCYTES NFR BLD: 0 %
LYMPHOCYTES # BLD AUTO: 2.5 10E3/UL (ref 0.8–5.3)
LYMPHOCYTES NFR BLD AUTO: 32 %
MCH RBC QN AUTO: 31.3 PG (ref 26.5–33)
MCHC RBC AUTO-ENTMCNC: 34 G/DL (ref 31.5–36.5)
MCV RBC AUTO: 92 FL (ref 78–100)
MONOCYTES # BLD AUTO: 0.6 10E3/UL (ref 0–1.3)
MONOCYTES NFR BLD AUTO: 7 %
NEUTROPHILS # BLD AUTO: 4.6 10E3/UL (ref 1.6–8.3)
NEUTROPHILS NFR BLD AUTO: 58 %
NRBC # BLD AUTO: 0 10E3/UL
NRBC BLD AUTO-RTO: 0 /100
OPIATES UR QL SCN: ABNORMAL
PCP QUAL URINE (ROCHE): ABNORMAL
PLATELET # BLD AUTO: 305 10E3/UL (ref 150–450)
POTASSIUM SERPL-SCNC: 3.9 MMOL/L (ref 3.4–5.3)
PROT SERPL-MCNC: 7.1 G/DL (ref 6.4–8.3)
RBC # BLD AUTO: 4.63 10E6/UL (ref 4.4–5.9)
SODIUM SERPL-SCNC: 139 MMOL/L (ref 135–145)
WBC # BLD AUTO: 7.8 10E3/UL (ref 4–11)

## 2025-02-17 PROCEDURE — 36415 COLL VENOUS BLD VENIPUNCTURE: CPT

## 2025-02-17 PROCEDURE — G0378 HOSPITAL OBSERVATION PER HR: HCPCS

## 2025-02-17 PROCEDURE — 99223 1ST HOSP IP/OBS HIGH 75: CPT | Mod: AI

## 2025-02-17 PROCEDURE — 80307 DRUG TEST PRSMV CHEM ANLYZR: CPT

## 2025-02-17 PROCEDURE — 99285 EMERGENCY DEPT VISIT HI MDM: CPT

## 2025-02-17 PROCEDURE — 250N000013 HC RX MED GY IP 250 OP 250 PS 637

## 2025-02-17 PROCEDURE — 85004 AUTOMATED DIFF WBC COUNT: CPT

## 2025-02-17 PROCEDURE — 80053 COMPREHEN METABOLIC PANEL: CPT

## 2025-02-17 PROCEDURE — 85041 AUTOMATED RBC COUNT: CPT

## 2025-02-17 RX ORDER — ACETAMINOPHEN 325 MG/1
650 TABLET ORAL EVERY 4 HOURS PRN
Status: DISCONTINUED | OUTPATIENT
Start: 2025-02-17 | End: 2025-02-18 | Stop reason: HOSPADM

## 2025-02-17 RX ORDER — HYDROXYZINE HYDROCHLORIDE 50 MG/1
50 TABLET, FILM COATED ORAL EVERY 6 HOURS PRN
Status: DISCONTINUED | OUTPATIENT
Start: 2025-02-17 | End: 2025-02-18 | Stop reason: HOSPADM

## 2025-02-17 RX ORDER — IBUPROFEN 600 MG/1
600 TABLET, FILM COATED ORAL EVERY 6 HOURS PRN
Status: DISCONTINUED | OUTPATIENT
Start: 2025-02-17 | End: 2025-02-18 | Stop reason: HOSPADM

## 2025-02-17 RX ORDER — OLANZAPINE 5 MG/1
5-10 TABLET, ORALLY DISINTEGRATING ORAL 2 TIMES DAILY PRN
Status: DISCONTINUED | OUTPATIENT
Start: 2025-02-17 | End: 2025-02-18 | Stop reason: HOSPADM

## 2025-02-17 RX ORDER — TRAZODONE HYDROCHLORIDE 50 MG/1
50 TABLET ORAL
Status: DISCONTINUED | OUTPATIENT
Start: 2025-02-17 | End: 2025-02-18 | Stop reason: HOSPADM

## 2025-02-17 RX ADMIN — TRAZODONE HYDROCHLORIDE 50 MG: 50 TABLET ORAL at 21:41

## 2025-02-17 ASSESSMENT — ACTIVITIES OF DAILY LIVING (ADL)
ADLS_ACUITY_SCORE: 41

## 2025-02-17 NOTE — TELEPHONE ENCOUNTER
Patient called DEC queue to inquire regarding a canceled appointment. Writer put patient on hold to inquire supervisors for reasoning of appointment cancellation. Patent was on hold for awhile and writer asked patient if they preferred a call back while coordinator looked into cancellation. Patient declined a call back and asked to be kept on hold. While on hold patient ended call.

## 2025-02-17 NOTE — Clinical Note
Jatin Huang was seen and treated in our emergency department on 2/17/2025.  He may return to work on 02/19/2025.       If you have any questions or concerns, please don't hesitate to call.      Lorri Pedraza MD

## 2025-02-17 NOTE — CONFIDENTIAL NOTE
Welfare Check:    A welfare check was called in to [place] non-emergency police department at 8:46 AM. Jatin made several statements indicating active suicidal thoughts to several staff member on 2/14/25, and 2/17/25. He also made statements about getting a gun to harm himself, and stated he shoot police is they showed up.    Ko Urena M.S. [facility]

## 2025-02-17 NOTE — TELEPHONE ENCOUNTER
Programmatic Care Declination Note    Program: Adult MH IOP/PHP  Date of referral: 2/13/25  Date of review: 2/17/25    Jatin Huang is being declined from programming due to criterion not being met and/or exclusionary criteria.   At this time patient is to acute for programmatic care due to ongoing threats of suicide, and threatening to harm police if they showed up at his home as a result of these suicidal statements.  Patient's current mental health symptoms are indicative of a higher level of care than what IOP or PHP can offer.  It appears IRTS placement may be the most beneficial patient needs around-the-clock supports related to his ongoing statements of suicide.  Patient has been instructed to present to the emergency department for another crisis assessment as patient ongoing suicidal statements since discharging from the ED.          Ko Urena M.S. [facility]  Lead Patient Placement Consultant

## 2025-02-17 NOTE — ED NOTES
[facility]  ED to EMPATH Checklist:      Goal for EMPATH: Depression management and Suicidality    Current Behavior: Calm and Cooperative    Safety Concerns: Suicidal, with a plan to CO poisoning and hanging    Legal Hold Status: [facility]    Medically Cleared by ED provider: Yes    Patient Therapeutically Searched: Not searched - Currently in triage    Belongings: Remain with patient    Independent Ambulation at Baseline: Yes/No: Yes    Participates in Care/Conversation: Yes/No: Yes    Patient Informed about EMPATH: Yes/No: Yes    DEC: Ordered and pending    Patient Ready to be Transferred to EMPATH? Yes/No: Yes

## 2025-02-17 NOTE — TELEPHONE ENCOUNTER
Writer spoke to Officer Dl who reports he has responded to 3 welfare checks on [place] over the weekend. He reports abhilash states he is in [place] when get to his apartment, and contact him by phone, but states he plans to make contact soon.    Officer Dl called back and reports that Abhilash states [place] are calling him and harassing him. He also reports that Abhilash stated he was out of state in [place] still. Officer Dl reports Abhilash was calm, and denies harm to himself at this time.

## 2025-02-17 NOTE — ED NOTES
Brought patient from ED triage. Patient has a plan to hang self, carbon monoxide self, cut self or use a gun. He was attempting to make IOP appts on Friday and today and both times he got angry and threatened suicide and the police were called.  He came in from work today after the police called him. He wants to go inpatient. He is depressed and anxious.  He has been pleasant and cooperative with staff.  He was here last week for a day.  He did not take the meds that were ordered for him due to difficulty getting off of them.Nursing and risk assessments completed.  Assessments reviewed with LMHP and physician. Video monitoring in progress, patient informed.  Admission information reviewed with patient. Patient given a tour of [facility] and instructions on using the facility. Questions regarding [facility] addressed.

## 2025-02-17 NOTE — TELEPHONE ENCOUNTER
"Adult MH PHP/IOP Declination:    Writer spoke to Jatin and explained to him why he was being declined from programming. Patient reports frustration with this stating he \"wants IOP to get help, but none of you talk to each other, and no one knows what's going on.\" Writer explains that he recommends a higher level of care with around-the-clock supports such as an IRTS, at this time as patient has made statements of harming himself and others PHP and IOP cannot support this level of care. Patient intimally denies making these statements and appears quite irritable surrounding this. He does admit to making them \"out of frustration\". Patient then reports that \"you are all liars\", writer reiterates that we are mandated reporters and must take all threats of harm to self or others seriously.  Writer reiterates there is a need for a higher level of care that what IOP or PHP can offer and attempted to provide [facility] number for additional [facility] supports/ IRTS. Patient the diverts to talking about feeling upset that police came to his home and provided inconsistent details of this account. Writer did confront him about details that were evident that he was being dishonest with writer or the police. Writer then redirected back to a need for a referral to a higher level of care and provided patient with the number for [facility]. Writer was explaining to patient if he was having thoughts of suicide he should represent to the emergency room, although clair hung up on writer during this portion of the call.     Ko Urena M.S. [facility]  Lead Patient Placement Consultant    "

## 2025-02-17 NOTE — CONSULTS
"Diagnostic Evaluation Consultation  Crisis Assessment    Patient Name: Jatin Huang  Age:  44 year old  Legal Sex: male  Gender Identity: male  Pronouns:   Race: Choose not to Answer  Ethnicity: Choose not to answer  Language: English      Patient was assessed: In person   Crisis Assessment Start Date: 02/17/25  Crisis Assessment Start Time: 1355  Crisis Assessment Stop Time: 1435  Patient location: [facility] Emergency Dept                             [id number]    Referral Data and Chief Complaint  Jatin Huang presents to the ED by  self. Patient is presenting to the ED for the following concerns: Anxiety, Depression, Suicidal ideation. Factors that make the mental health crisis life threatening or complex are: Pt returns to the ED due to worsening depression, anxiety, and increased SI with plans and intent.     Pt reports he was here last week, with plan for IOP/PHP. After discharge, Pt had his initial phone call with navigator, then was scheduled for intake appointment. Pt reports he received an email with day/date, but there was a typo as the info said \"Tuesday 2/17,\" so Pt called this morning to get the date clarified. This resulted in Pt being told he did not have any appointments scheduled, which overwhelmed and frustrated the Pt. After several difficult interactions, Pt made several statements about how he was just 'going to end;' when the coordinator stated police would be called for a welfare check, Pt then responded with plans to kill police if they arrived.     Upon interview, Pt denies any desire to harm others, but states he's exhausted and hopeless about his own situation and mental health improving. Pt describes repeated patterns where he becomes easily overwhelmed, which reinforces the fear that nothing will improve, then he will make or say impulsive remarks that further exacerbates the situation. Today, Pt does endorse significant active suicidal thoughts with specific ideas, " including carbon monoxide poisoning, hanging, overdosing, or using a firearm. Pt denies access to firearms, but does state his desire to act on these thoughts are 5/5 and would not be able to remain safe if he were to discharge home today. Pt is seeking inpatient admission, and is open to potentially trying ECT if medically recommended.    Informed Consent and Assessment Methods  Explained the crisis assessment process, including applicable information disclosures and limits to confidentiality, assessed understanding of the process, and obtained consent to proceed with the assessment.  Assessment methods included conducting a formal interview with patient, review of medical records, collaboration with medical staff, and obtaining relevant collateral information from family and community providers when available.  : done     History of the Crisis   Pt was recently seen at [facility] about 5 days ago (2/12/25). Pt reports one brief IPMH stay, but discharged after 48 hours due to fear of cost for hospitalization. Pt was recommended IOP/PHP, but experienced significant challenges with following through on this recommendation, which further exacerbated his MH symptoms. Pt reports current MH diagnoses include, PDD, VELMA, and borderline personality disorder. Pt also endorses a remote history of drug addiction, noting he struggled with opioid addiction, but has been sober for over 10 years. Pt currently does not have outpatient resources or providers, but notes he has trialed many medications with limited benefits; Pt has also previously tried DBT and TMS, with no positive benefits. Pt reports strong social supports with family and friends.    Brief Psychosocial History  Family:  Single, Children no  Support System:  Friend (reports strong supports from friends and family)  Employment Status:  employed full-time  Source of Income:  salary/wages  Financial Environmental Concerns:  none  Current Hobbies:  games, music,  group/social activities  Barriers in Personal Life:  emotional concerns, mental health concerns    Significant Clinical History  Current Anxiety Symptoms:  anxious, excessive worry, racing thoughts  Current Depression/Trauma:  apathy, crying or feels like crying, irritable, helplessness, hopelessness, sadness, thoughts of death/suicide, difficulty concentrating  Current Somatic Symptoms:  anxious, excessive worry, racing thoughts  Current Psychosis/Thought Disturbance:  impulsive, agitation  Current Eating Symptoms:   (does not endorse)  Chemical Use History:  Alcohol: None  Benzodiazepines: None  Opiates: None  Cocaine: None  Marijuana: None  Other Use: None   Past diagnosis:  Anxiety Disorder, Depression, Personality Disorder, Substance Use Disorder  Family history:  Schizophrenia, Bipolar Disorder  Past treatment:  Individual therapy, Primary Care, Psychiatric Medication Management, Day Treatment  Details of most recent treatment:  Pt terminated OP therapy a couple weeks; seen on [facility] 5 days ago  Other relevant history:  has tried TMS, DBT    Have there been any medication changes in the past two weeks:  patient is not on psychiatric meds       Is the patient compliant with medications:           Collateral Information  Is there collateral information: No      Risk Assessment  [place] Suicide Severity Rating Scale Full Clinical Version:  Suicidal Ideation  Q6 Suicide Behavior (Lifetime): yes       [place] Suicide Severity Rating Scale Recent:   Suicidal Ideation (Recent)  Q1 Wished to be Dead (Past Month): yes  Q2 Suicidal Thoughts (Past Month): yes  Q3 Suicidal Thought Method: yes  Q4 Suicidal Intent without Specific Plan: yes  Q5 Suicide Intent with Specific Plan: yes  If yes to Q6, within past 3 months?: yes  Level of Risk per Screen: high risk  Intensity of Ideation (Recent)  Most Severe Ideation Rating (Past 1 Month): 5  Frequency (Past 1 Month): Many times each day  Duration (Past 1 Month): More than 8  hours/persistent or continuous  Controllability (Past 1 Month): Unable to control thoughts  Deterrents (Past 1 Month): Deterrents most likely did not stop you  Reasons for Ideation (Past 1 Month): Completely to end or stop the pain (You couldn't go on living with the pain or how you were feeling)  Suicidal Behavior (Recent)  Actual Attempt (Past 3 Months): No  Total Number of Actual Attempts (Past 3 Months): 0  Has subject engaged in non-suicidal self-injurious behavior? (Past 3 Months): No  Interrupted Attempts (Past 3 Months): No  Aborted or Self-Interrupted Attempt (Past 3 Months): No  Preparatory Acts or Behavior (Past 3 Months): Yes  Preparatory Acts or Behavior Description (Past 3 Months): does not provide details    Environmental or Psychosocial Events: impulsivity/recklessness, helplessness/hopelessness  Protective Factors: Protective Factors: strong bond to family unit, community support, or employment, responsibilities and duties to others, including pets and children, lives in a responsibly safe and stable environment, good treatment engagement, able to access care without barriers, help seeking, good problem-solving, coping, and conflict resolution skills    Does the patient have thoughts of harming others? Feels Like Hurting Others: no  Previous Attempt to Hurt Others: no  Current presentation:  (engaged, cooperative, insightful)  Is the patient engaging in sexually inappropriate behavior?: no  Does Patient have a known history of aggressive behavior: No  Has aggression occurred as a result of MH concerns/diagnosis: No  Does patient have history of aggression in hospital: No    Is the patient engaging in sexually inappropriate behavior?  no        Mental Status Exam   Affect: Appropriate  Appearance: Appropriate  Attention Span/Concentration: Attentive  Eye Contact: Engaged    Fund of Knowledge: Appropriate   Language /Speech Content: Fluent  Language /Speech Volume: Normal  Language /Speech  Rate/Productions: Normal  Recent Memory: Intact  Remote Memory: Intact  Mood: Sad, Depressed, Apathetic, Anxious  Orientation to Person: Yes   Orientation to Place: Yes  Orientation to Time of Day: Yes  Orientation to Date: Yes     Situation (Do they understand why they are here?): Yes  Psychomotor Behavior: Normal  Thought Content: Suicidal  Thought Form: Intact, Goal Directed     Mini-Cog Assessment  NA     Medication  Psychotropic medications:   Medication Orders - Psychiatric (From admission, onward)      None             Current Care Team  Patient Care Team:  Mauro Cervantes NP as PCP - General    Diagnosis  Patient Active Problem List   Diagnosis Code    Severe episode of recurrent major depressive disorder, without psychotic features (H) F33.2       Primary Problem This Admission  Active Hospital Problems    Severe episode of recurrent major depressive disorder, without psychotic features (H)        Clinical Summary and Substantiation of Recommendations   Clinical Substantiation:  Pt returns to the ED due to worsening depression, anxiety, and active suicidal ideation with plans and intent. Pt is engaged and insightful, able to share history of struggling with persistent, treatment resistent depression. During the assessment, Pt repeatedly states 'I don't know why I came here' but then is also able to identify likely risk to complete suicide if he did not present to the ED. Pt demonstrates significant risk to himself if he were to discharge the hospital, and medical necessity for inpatient hospitalization. Pt does not display any evidence of risk to harm others, but will often express frustrations in reactive manners. Pt is seeking to possibly complete ECT while inpatient. Upon discharge from inpatient hospitalization, Pt will likely benefit from a step-down to PHP or IOP level of support to further stabilize Pt's mental health.    Goals for crisis stabilization:  reduce intensity of SI    Next steps for Care  Team:  explore medication options; determine appropriate step-down resources at time of discharge    Treatment Objectives Addressed:  rapport building, processing feelings, assessing safety, exploring obstacles to safety in the community    Therapeutic Interventions:  Engaged in cognitive restructuring/ reframing, looked at common cognitive distortions and challenged negative thoughts., Provided positive reinforcement for progress towards goals, gains in knowledge, and application of skills previously taught.    Has a specific means been identified for suicidal/homicide actions: Yes    If yes, describe:  Carbon monoxide poisoning, hanging, overdose on medications, or firearm    Explain action steps toward mitigation:  Pt will remain in the ED, awaiting inpt placement.    Document completion of mitigation actions:  Pt is voluntarily seeking IPMH placement    The follow up action still needed prior to discharge:  will need to be reassessed for active SI and risk to self when he discharges the hospital.    Patient coping skills attempted to reduce the crisis:  tried to follow through on referrals and recommendations made during previous [facility] stay    Disposition  Recommended referrals: Medication Management, Individual Therapy, Programmatic Care        Reviewed case and recommendations with attending provider. Attending Name: NIRMAL Rios, BALDEV       Attending concurs with disposition: yes       Patient and/or validated legal guardian concurs with disposition:   yes       Final disposition:  inpatient mental health         Imminent risk of harm: Suicidal Behavior  Severe psychiatric, behavioral or other comorbid conditions are appropriate for management at inpatient mental health as indicated by at least one of the following: Psychiatric Symptoms  Severe dysfunction in daily living is present as indicated by at least one of the following: Other evidence of severe dysfunction  Situation and expectations are  appropriate for inpatient care: Voluntary treatment at lower level of care is not feasible  Inpatient mental health services are necessary to meet patient needs and at least one of the following: Specific condition related to admission diagnosis is present and judged likely to further improve at proposed level of care      Legal status: Voluntary/Patient has signed consent for treatment                            Reviewed court records: yes     Assessment Details   Total duration spent with the patient: 40 min     CPT code(s) utilized: 10774 - Psychotherapy for Crisis - 60 (30-74*) min    LIYAH SARABIA, Psychotherapist  [facility] - Triage & Transition Services  Callback: [phone]

## 2025-02-17 NOTE — TELEPHONE ENCOUNTER
S: CLARENCE Padilla  Aubree  calling at 4:27PM about a 44 year old/Male presenting with SI w/ several plans.      B: Pt arrived via Self . Presenting problem, stressors: Pt has active SI with several plans via hanging, overdosing on medications, carbon monoxide poisoning, and using firearm if he has a gun.  Stressors include: no external stressors.  He has anxiety,persistent depressive disorder.     Pt affect in ED: Calm, Cooperative , Engaged, and insightful.  Pt Dx: Major Depressive Disorder, Generalized Anxiety Disorder, and Borderline Personality Disorder  Previous IPMH hx? Yes: just one in 2022, it was a short stay.   Pt endorses SI with a plan to 5/5 intentional with plans.     Hx of suicide attempt? No  Pt denies SIB  Pt denies HI   Pt denies hallucinations .   Pt RARS Score: 3    Hx of aggression/violence, sexual offenses, legal concerns, Epic care plan? describe: No  Current concerns for aggression this visit? No  Does pt have a history of Civil Commitment? No  Is Pt their own guardian? Yes    Pt is not prescribed medication. Is patient medication compliant? N/A  Pt denies OP services   CD concerns: None  Acute or chronic medical concerns: No  Does Pt present with specific needs, assistive devices, or exclusionary criteria? None      Pt is ambulatory  Pt is able to perform ADLs independently      A: Pt to be reviewed for [facility] admission. Pt is Voluntary  Preferred placement: [facility] and go to hospitals that provide ECT.     COVID Symptoms: No  If yes, COVID test required   Utox: Not ordered, intake to request lab    CMP: Not ordered, intake requested lab  CBC: Not ordered, intake requested lab  HCG: N/A    R: Patient cleared and ready for behavioral bed placement: Yes  Pt placed on IP worklist? Yes    Does Patient need a Transfer Center request created? Yes, writer completed Transfer Center request at:      R: [place] [facility]patient Bed Call Log 2/17/25  @ 6:00 PM:   Intake has called facilities that have  not updated the bed status within the last 12 hours.                [facility] is at capacity.         [facility] is posting 3 beds. [phone]  Per call @12:24 PM no beds available  [facility] is posting 0 beds. Negative covid required.    [facility] is posting 0 beds. Neg covid. No high [facility]. [phone] Per Call @ 7:56AM  Call back at around 930am, currently at capacity     [facility] is posting 0 beds. [phone]   [facility] is posting 0 beds. [phone]    [facility] is posting 6 beds. Negative covid. [phone] ages 18-35 only.  [facility] is posting 0 beds [phone].         Pt remains on the work list pending appropriate bed availability.

## 2025-02-17 NOTE — ED TRIAGE NOTES
Pt c/o depression and SI w/ plan of carbon monoxide poisoning and hanging, pt seen at [facility] a few days ago, pt can consent for safety in triage, ABCD intact.       Triage Assessment (Adult)       Row Name 02/17/25 1201          Triage Assessment    Airway WDL WDL        Respiratory WDL    Respiratory WDL WDL        Skin Circulation/Temperature WDL    Skin Circulation/Temperature WDL WDL        Cardiac WDL    Cardiac WDL WDL        Peripheral/Neurovascular WDL    Peripheral Neurovascular WDL WDL        Cognitive/Neuro/Behavioral WDL    Cognitive/Neuro/Behavioral WDL WDL

## 2025-02-17 NOTE — TELEPHONE ENCOUNTER
Writer received call from pt who requested to schedule [facility]. Apt was scheduled for 2/17/2024 at 10 am.   Pt also fwd conversation chain to coordinator in which pt was informed of scheduled apt.  Writer will consult with supervisor to see how else pt can be helped.

## 2025-02-17 NOTE — PLAN OF CARE
Jatin T Reina  February 17, 2025  Plan of Care Hand-off Note     Patient Recommended Care Path: inpatient mental health    Clinical Substantiation:  Pt returns to the ED due to worsening depression, anxiety, and active suicidal ideation with plans and intent. Pt is engaged and insightful, able to share history of struggling with persistent, treatment resistent depression. During the assessment, Pt repeatedly states 'I don't know why I came here' but then is also able to identify likely risk to complete suicide if he did not present to the ED. Pt demonstrates significant risk to himself if he were to discharge the hospital, and medical necessity for inpatient hospitalization. Pt does not display any evidence of risk to harm others, but will often express frustrations in reactive manners. Pt is seeking to possibly complete ECT while inpatient. Upon discharge from inpatient hospitalization, Pt will likely benefit from a step-down to PHP or [facility] level of support to further stabilize Pt's mental health.    Goals for crisis stabilization:  reduce intensity of SI    Next steps for Care Team:  explore medication options; determine appropriate step-down resources at time of discharge    Treatment Objectives Addressed:  rapport building, processing feelings, assessing safety, exploring obstacles to safety in the community    Therapeutic Interventions:  Engaged in cognitive restructuring/ reframing, looked at common cognitive distortions and challenged negative thoughts., Provided positive reinforcement for progress towards goals, gains in knowledge, and application of skills previously taught.    Has a specific means been identified for suicidal.homicide actions: Yes  If yes, describe: Carbon monoxide poisoning, hanging, overdose on medications, or firearm  Explain action steps toward mitigation: Pt will remain in the ED, awaiting inpt placement.  Document completion of mitigation action: Pt is voluntarily seeking [facility]  placement  The follow up action still needed prior to discharge: will need to be reassessed for active SI and risk to self when he discharges the hospital.    Patient coping skills attempted to reduce the crisis:  tried to follow through on referrals and recommendations made during previous [facility] stay       Imminent risk of harm: Suicidal Behavior  Severe psychiatric, behavioral or other comorbid conditions are appropriate for management at inpatient mental health as indicated by at least one of the following: Psychiatric Symptoms  Severe dysfunction in daily living is present as indicated by at least one of the following: Other evidence of severe dysfunction  Situation and expectations are appropriate for inpatient care: Voluntary treatment at lower level of care is not feasible  Inpatient mental health services are necessary to meet patient needs and at least one of the following: Specific condition related to admission diagnosis is present and judged likely to further improve at proposed level of care      Collateral contact information:       Legal Status: Voluntary/Patient has signed consent for treatment    Reviewed court records: yes     Psychiatry Consult: NA, remains on [facility]    LIYAH SARABIA

## 2025-02-17 NOTE — TELEPHONE ENCOUNTER
"Writer received call from pt who requested confirmation on apt time. Pt was informed they do not have an apt scheduled as pt opted not to schedule w/ [facility]. Pt became upset during call saying that apt was not for an infake and reported that \"it doesn't matter, I will end it all anyway\" writer asked pt if he had plans of killing himself, pt denied harm to self or others, no plans to kill himself. Pt reported that an apt was scheduled by coordinator who spoke with and that email was sent to him with apt confirmation. Writer checked in both Epic and CC, and no DA apts appeared to be scheduled. Writer offered to schedule apt w/ [facility] and pt agreed, apt was scheduled for 2/18/2024 at 8am. Pt later reported that the apt was not the correct apt type. Pt later opted to cancel the apt. Pt reported \"it doesn't matter, cancel the appointment, it won't matter soon\" and ended the call. Writer called pt back to make sure pt was safe and pt requested not to be called. That he's safe, he wont' kill himself, nor harm himself nor others. Pt asked not to be called again unless it was the coordinator with whom he had worked with and requested that police not be called to his home.    "

## 2025-02-17 NOTE — ED NOTES
IP MH Referral Acuity Rating Score (RARS)    LMHP complete at referral to IP MH, with [facility]; and, daily while awaiting IP MH placement. Call score to PPS.  CRITERIA SCORING   New 74 HH and Involuntary for IP MH (not adolescent) 0/3   Boarding over 24 hours 0/1   Vulnerable adult at least 55+ with multiple co morbidities; or, Patient age 11 or under 0/1   Suicide ideation without relief of precipitating factors 1/1   Current plan for suicide 1/1   Current plan for homicide 0/1   Imminent risk or actual attempt to seriously harm another without relief of factors precipitating the attempt 0/1   Severe dysfunction in daily living (ex: complete neglect for self care, extreme disruption in vegetative function, extreme deterioration in social interactions) 1/1   Recent (last 2 weeks) or current physical aggression in the ED 0/1   Restraints or seclusion episode in ED 0/1   Verbal aggression, agitation, yelling, etc., while in the ED 0/1   Active psychosis with psychomotor agitation or catatonia 0/1   Need for constant or near constant redirection (from leaving, from others, etc).  0/1   Intrusive or disruptive behaviors 0/1   TOTAL 3

## 2025-02-17 NOTE — ED PROVIDER NOTES
Emergency Department Note      History of Present Illness     Chief Complaint   Depression and Suicidal      HPI   Jatin Huang is a 44 year old male who arrives to the department due to suicidal thoughts with multiple plans to commit suicide.  The patient was in [facility] last week and intensive outpatient treatment was recommended, but then the patient was told to come to the ED since he is too high risk for IOP.  He has multiple plans for committing suicide, but denies acting on any of the plans.  Denies any recent overdose, but states 10 years ago he overdosed.  Denies hallucinations or thoughts of harming others.    Independent Historian   None    Review of External Notes   none    Past Medical History     Medical History and Problem List   MDD  Anxiety  Opioid abuse  Heroin abuse    Medications   Truvasa  Trintellix  Venlafaxine  Albuterol  Revatio  Prozac  Depakote  Strattera  Prednisone    Surgical History   Tonsillectomy  Appendectomy    Physical Exam     Patient Vitals for the past 24 hrs:   BP Temp Temp src Pulse Resp SpO2   02/17/25 1159 (!) 148/91 97.6  F (36.4  C) Temporal 78 16 99 %     Physical Exam  Nursing note and vitals reviewed.  Constitutional:  Appears well-developed and well-nourished.   HENT:   Head:    Atraumatic.   Mouth/Throat:   Oropharynx is clear and moist. No oropharyngeal exudate.   Eyes:    Pupils are equal, round, and reactive to light.   Neck:    Normal range of motion. Neck supple.      No tracheal deviation present. No thyromegaly present.   Cardiovascular:  Normal rate, regular rhythm, no murmur   Pulmonary/Chest: Breath sounds are clear and equal without wheezes or crackles.  Abdominal:   Soft. Bowel sounds are normal. Exhibits no distension and      no mass. There is no tenderness.      There is no rebound and no guarding.   Musculoskeletal:  Exhibits no edema.   Lymphadenopathy:  No cervical adenopathy.   Neurological:   Alert and oriented to person, place, and time.    Skin:    Skin is warm and dry. No rash noted. No pallor.      Diagnostics     Lab Results   Labs Ordered and Resulted from Time of ED Arrival to Time of ED Departure - No data to display    Imaging   No orders to display       EKG   None    Independent Interpretation   None    ED Course      Medications Administered   Medications - No data to display    Procedures   None     Discussion of Management   None    ED Course   ED Course as of 02/17/25 1233   Mon Feb 17, 2025   1202 I obtained history and examined the patient as noted above.        Additional Documentation  None    Medical Decision Making / Diagnosis     CMS Diagnoses: None    MIPS   None    MDM   Jatin Huang is a 44 year old male who arrives to the emergency department due to suicidal ideations with a plan.  He denies any recent overdose or suicide attempt.  He does not appear psychotic.  I felt he was medically cleared and he was sent to [facility] for further mental health evaluation.  I placed patient on an BRETT due to suicidal thoughts.    Disposition   The patient was transferred to [facility].     Diagnosis     ICD-10-CM    1. Suicidal ideation  R45.851          Scribe Disclosure:  I, Delio Gil, am serving as a scribe at 12:32 PM on 2/17/2025 to document services personally performed by Rani Tapia MD based on my observations and the provider's statements to me.        Rani Tapia MD  02/17/25 1950

## 2025-02-18 ENCOUNTER — TELEPHONE (OUTPATIENT)
Dept: BEHAVIORAL HEALTH | Facility: CLINIC | Age: 45
End: 2025-02-18
Payer: COMMERCIAL

## 2025-02-18 VITALS
WEIGHT: 200 LBS | OXYGEN SATURATION: 99 % | RESPIRATION RATE: 18 BRPM | DIASTOLIC BLOOD PRESSURE: 86 MMHG | HEIGHT: 72 IN | SYSTOLIC BLOOD PRESSURE: 125 MMHG | TEMPERATURE: 98.6 F | HEART RATE: 82 BPM | BODY MASS INDEX: 27.09 KG/M2

## 2025-02-18 PROCEDURE — G0378 HOSPITAL OBSERVATION PER HR: HCPCS

## 2025-02-18 PROCEDURE — 250N000013 HC RX MED GY IP 250 OP 250 PS 637

## 2025-02-18 PROCEDURE — 250N000013 HC RX MED GY IP 250 OP 250 PS 637: Performed by: PSYCHIATRY & NEUROLOGY

## 2025-02-18 PROCEDURE — 99239 HOSP IP/OBS DSCHRG MGMT >30: CPT | Performed by: PSYCHIATRY & NEUROLOGY

## 2025-02-18 RX ORDER — EMTRICITABINE AND TENOFOVIR DISOPROXIL FUMARATE 200; 300 MG/1; MG/1
1 TABLET, FILM COATED ORAL DAILY
Status: DISCONTINUED | OUTPATIENT
Start: 2025-02-18 | End: 2025-02-18 | Stop reason: HOSPADM

## 2025-02-18 RX ADMIN — VORTIOXETINE 10 MG: 10 TABLET, FILM COATED ORAL at 09:20

## 2025-02-18 RX ADMIN — EMTRICITABINE AND TENOFOVIR DISOPROXIL FUMARATE 1 TABLET: 200; 300 TABLET, FILM COATED ORAL at 09:20

## 2025-02-18 ASSESSMENT — COLUMBIA-SUICIDE SEVERITY RATING SCALE - C-SSRS
2. HAVE YOU ACTUALLY HAD ANY THOUGHTS OF KILLING YOURSELF?: YES
6. HAVE YOU EVER DONE ANYTHING, STARTED TO DO ANYTHING, OR PREPARED TO DO ANYTHING TO END YOUR LIFE?: NO
SUICIDE, SINCE LAST CONTACT: NO
ATTEMPT SINCE LAST CONTACT: NO
TOTAL  NUMBER OF ABORTED OR SELF INTERRUPTED ATTEMPTS SINCE LAST CONTACT: NO
REASONS FOR IDEATION SINCE LAST CONTACT: DOES NOT APPLY
1. SINCE LAST CONTACT, HAVE YOU WISHED YOU WERE DEAD OR WISHED YOU COULD GO TO SLEEP AND NOT WAKE UP?: YES
TOTAL  NUMBER OF INTERRUPTED ATTEMPTS SINCE LAST CONTACT: NO
5. HAVE YOU STARTED TO WORK OUT OR WORKED OUT THE DETAILS OF HOW TO KILL YOURSELF? DO YOU INTEND TO CARRY OUT THIS PLAN?: NO

## 2025-02-18 ASSESSMENT — ACTIVITIES OF DAILY LIVING (ADL)
ADLS_ACUITY_SCORE: 41

## 2025-02-18 NOTE — DISCHARGE INSTRUCTIONS
Return if having thoughts of suicide with plan and intent.     Use safety plan.     Consider with your outpatient prescriber clonidine or guanfacine for impulse control.      Can consider consultation for ECT as an OP or return if you would like to pursue inpatient to a hospital that does ECT.

## 2025-02-18 NOTE — ED NOTES
Pt has been calm and cooperative here on the unit. Pt presents as sad and tense, but is able to joke with staff. Pt has been eating, drinking, and taking medications. Pt endorsed SI but contracts for safety on [facility]. On IP waitlist with the intent to do ECT. Lab work ordered. Pt declined Covid swab due to being asymptomatic, and this is not required per note from intake. Pt met with provider and will start Trintellix tomorrow. Pt took Trazodone at bedtime.

## 2025-02-18 NOTE — ED PROVIDER NOTES
[facility]  [facility] Emergency Department  Observation Initiation Date: Feb 17, 2025    Jatin Huang MRN: [id number]   Age: 44 year old YOB: 1980     History     Chief Complaint   Patient presents with    Depression    Suicidal     HPI  Jatin Huang is a 44 year old male with a past history notable for depression, anxiety, and polysubstance use in sustained remission who presented to the emergency department for evaluation of worsening depressive symptoms and suicidal ideation.  Patient reports numerous suicide plans including hanging self, carbon monoxide poisoning, cutting himself, or using a gun to shoot himself. In the emergency department, this patient was determined to be medically stable and [facility].  Record review indicates patient recently presented to [facility] on 2/12/2025 and received referral for programmatic care.  Notes reviewed from telephone conversations today regarding subsequent declination for programmatic care and recommendation for higher level of care as patient made suicidal statements and statements regarding harming police officers after becoming escalated that he was not able to complete intake appointment for programming. They have currently been in the emergency department for 9 hours.     Patient was agreeable to accompany me to a consult room for assessment.  He explains becoming easily overwhelmed this morning when having difficulties trying to establish programmatic care.  He notes this led to intense fears that he will no longer improve and continue to feel this depressed and suicidal.  He notes that during periods of intense anxiety and a sense of feeling overwhelmed, he becomes impulsive and cannot control his emotions.  He reiterates that he has no desire to harm others but that he has exhausted and tired of trying to navigate the mental health system.  He states he has  "essentially given up as he has continued to try to seek help with minimal perceived benefit.  He endorses anhedonia, disrupted sleep, social withdrawal, decreased executive functioning, and mood lability.  He recalls numerous previous medication trials of various antidepressants, augmentation strategies, DBT, therapy, and TMS with limited perceived benefit.  He recalls intense fears of taking any medications that could potentially be used and/or lead to withdrawal side effects given his past substance use that he is now in sustained remission for.  During his most recent empath visit, he was prescribed Trintellix however when he went to the pharmacy he was informed that this could be abused and could lead to withdrawal side effects thus he was too afraid to start this.  He is still somewhat hesitant to try medications however notes he needs to do something different and is willing to start Trintellix while on the unit.  Given the intense depressive symptoms that he has been unable to find relief from, he would like to pursue ECT and inpatient hospitalization.  He continues to endorse suicidal thoughts in addition to acting on these thoughts.  He does not feel as though he would be safe if he were to discharge home.  He does not have any current outpatient mental health providers noting that he has \"given up on everything.\" He denies HI and denies any symptoms of psychosis.      Past Medical History  No past medical history on file.  No past surgical history on file.  emtricitabine-tenofovir (TRUVADA) 200-300 MG per tablet  vortioxetine (TRINTELLIX) 10 MG tablet      No Known Allergies  Family History  No family history on file.  Social History           Review of Systems  A medically appropriate review of systems was performed with pertinent positives and negatives noted in the HPI, and all other systems negative.    Physical Examination   BP: (!) 148/91  Pulse: 78  Temp: 97.6  F (36.4  C)  Resp: 16  Height: 182.9 " cm (6')  Weight: 90.7 kg (200 lb)  SpO2: 99 %    Physical Exam  General: Appears stated age.   Neuro: Alert and fully oriented. Extremities appear to demonstrate normal strength on visual inspection.   Integumentary/Skin: no rash visualized, normal color    Psychiatric Examination   Appearance: awake, alert, adequately groomed, appeared as age stated, and casually dressed  Attitude:  cooperative  Eye Contact:  fair  Mood:  depressed  Affect:  mood congruent and intensity is flat  Speech:  clear, coherent and normal prosody  Psychomotor Behavior:  no evidence of tardive dyskinesia, dystonia, or tics and intact station, gait and muscle tone  Thought Process:  linear  Associations:  no loose associations  Thought Content:  no evidence of psychotic thought, active suicidal ideation present with numerous plans and intent, and denies HI  Insight:  good  Judgement:  intact  Oriented to:  time, person, and place  Attention Span and Concentration:  intact  Recent and Remote Memory:  intact  Language: able to name/identify objects without impairment  Fund of Knowledge: intact with awareness of current and past events    ED Course     ED Course as of 02/17/25 1927 Mon Feb 17, 2025   1202 I obtained history and examined the patient as noted above.        Labs Ordered and Resulted from Time of ED Arrival to Time of ED Departure - No data to display    Assessments & Plan (with Medical Decision Making)   Patient presenting with severe and persistent depressive symptoms along with suicidal ideation including numerous plans and intent.  Patient recalls numerous previous medication trials, augmentation strategies, TMS, and nonpharmacologic supports with minimal perceived benefit.  Given severity of symptoms, he is seeking inpatient mental health hospitalization and would like to pursue ECT.  Noting that he was recently on the unit, he is amendable to a trial of Trintellix which was previously discussed however he has not yet  initiated this.  Treatment plan focused on additional antidepressant medication initiation while awaiting transfer to next available inpatient psychiatric bed and pursue all of ECT.  Patient would also benefit from reestablishing outpatient therapy and psychiatric medication management upon discharge.  Nursing notes reviewed noting no acute issues.     I have reviewed the assessment completed by the [facility].     During the observation period, the patient did not require medications for agitation, and did not require restraints/seclusion for patient and/or provider safety.     The patient was found to have a psychiatric condition that would benefit from an observation stay in the emergency department for further psychiatric stabilization and/or coordination of a safe disposition. The observation plan includes serial assessments of psychiatric condition, potential administration of medications if indicated, further disposition pending the patient's psychiatric course during the monitoring period.     Preliminary diagnosis:    ICD-10-CM    1. Suicidal ideation  R45.851       2. Severe episode of recurrent major depressive disorder, without psychotic features (H)  F33.2            Treatment Plan:  -Start Trintellix 10 mg daily further targeting depression and anxiety.  -Em[facility] standing order medications available   -Medication education provided this visit including but not limited to: Rationale for medication, importance of medication adherence, medication interactions, common medication side effects, benefits of medications.  -Patient will need referral for psychiatric medication management and therapy upon discharge  -Problem focused supportive therapy and education provided today related to patient's current and acute stressors, symptoms, and diagnoses.  -Patient would benefit from inpatient stabilization and is awaiting transfer to next available inpatient bed, patient is voluntary.  Patient would like to pursue  ECT.       --  NIRMAL Rios CNP   [facility] EMERGENCY DEPT  EmPATH Unit       Armida Marc APRN CNP  02/17/25 2019

## 2025-02-18 NOTE — PROGRESS NOTES
"Triage and Transition Services Extended Care Reassessment     Patient: Jatin goes by \"Jatin,\" uses he/him pronouns  Date of Service: February 18, 2025  Site of Service: [facility] Emergency Dept                               Patient was seen    Mode of Assessment:   In Person    Reason for Reassessment: suicidal ideation, depression    History of Patient's Original Emergency Room Encounter: Pt was recently seen at [facility] about 5 days ago (2/12/25). Pt reports one brief IPMH stay, but discharged after 48 hours due to fear of cost for hospitalization. Pt was recommended IOP/PHP, but experienced significant challenges with following through on this recommendation, which further exacerbated his MH symptoms. Pt reports current MH diagnoses include, PDD, VELMA, and borderline personality disorder. Pt also endorses a remote history of drug addiction, noting he struggled with opioid addiction, but has been sober for over 10 years. Pt currently does not have outpatient resources or providers, but notes he has trialed many medications with limited benefits; Pt has also previously tried DBT and TMS, with no positive benefits. Pt reports strong social supports with family and friends.    Current Patient Presentation: Calm, cooperative, engaged    Presentation Summary: Pt was sitting in chair when approached by writer and was agreeable to meet. Pt reports feeling \"fine\" and \"slept okay\". Pt vocalized feeling frustrated and hopeless/helpless after 20+ years of mental health struggles and attempted treatment. Pt states that he has tried multiple medications and therapies and none of them have been successful in treating his depression. Pt expressed feeling like his executive functioning has been impacted by his mental health and that has made remembering and writing down appoiments difficult. When asked about his SI Pt reported severity at 1 or 2 out of 5. Pt denies any current plan or intent to act on SI. Pt " "said \"If I was going to do it I think I would have done it already\" referring to completing suicide. Pt believes that SI and expressing intent and plan is a coping skill for him when he feels overwhelmed. Pt confirms baseline SI at 1 or 2 out of 5. Pt denies AH/VH/HI.    Changes Observed Since Initial Assessment: patient/family request    Therapeutic Interventions Provided: Engaged in guided discovery, explored patient's perspectives and helped expand them through socratic dialogue.    Current Symptoms:  (None reported) thoughts of death/suicide, helplessness, hopelessness, difficulty concentrating, crying or feels like crying  (None reported)  (None reported)  (None reported)    Mental Status Exam   Affect: Appropriate  Appearance: Appropriate  Attention Span/Concentration: Attentive  Eye Contact: Engaged    Fund of Knowledge: Appropriate   Language /Speech Content: Fluent  Language /Speech Volume: Normal  Language /Speech Rate/Productions: Normal  Recent Memory: Intact  Remote Memory: Intact  Mood: Normal  Orientation to Person: Yes   Orientation to Place: Yes  Orientation to Time of Day: Yes  Orientation to Date: Yes     Situation (Do they understand why they are here?): Yes  Psychomotor Behavior: Normal  Thought Content: Clear  Thought Form: Intact    Treatment Objective(s) Addressed: rapport building, identifying and practicing coping strategies, safety planning, assessing safety, Lethal means Counseling    Patient Response to Interventions: acceptance expressed, verbalizes understanding    Progress Towards Goals:  Patient Reports Symptoms Are: ongoing  Patient Progress Toward Goals: is making progress  Comment: Pt feels like 20+ years of medications and therapies for his depression have been unhelpful. However, believes that SI and expressing intent and plan is a coping skill for him when he feels overwhelmed.  Next Step to Work Toward Discharge: means restriction, patient ability to engage in safety planning, " symptom stabilization  Symptom Stabilization Comment: Pt reports a reduction in SI when speaking with writer. Pt reported SI at a 1 or 2 out of 5 compared to admission. Pt vocalized SI being a coping mechanism when he is feeling overwhelmed. Pt does not have access to firearms or medications that he believes would be able to aid in any suicide attempt. Writer and Pt were able to engage in safety planning and lethal means counseling.  Ability to Engage Comment: Pt was able to engage in safety planning and lethal means counseling with writer.  Means Restriction: Pt was able to engage in safety planning and lethal means counseling with writer. Pt does not have access to firearms, Pt denies having enough medications to overdose on. Writer offered Pt a lockbox to which he denied. Pt lives in a condo and does not have a garage that would allow him to utilize carbon dioxide poisoning as a means of suicide.    Case Management:      C-SSRS Since Last Contact:   1. Wish to be Dead (Since Last Contact): Yes  2. Non-Specific Active Suicidal Thoughts (Since Last Contact): Yes  3. Active Suicidal Ideation with any Methods (Not Plan) Without Intent to Act (Since Last Contact): Yes  4. Active Suicidal Ideation with Some Intent to Act, Without Specific Plan (Since Last Contact): No  5. Active Suicidal Ideation with Specific Plan and Intent (Since Last Contact): No  Most Severe Ideation Rating (Since Last Contact): 2  Frequency (Since Last Contact): Daily or almost daily  Duration (Since Last Contact): Less than 1 hour/some of the time  Controllability (Since Last Contact): Can control thoughts with little difficulty  Deterrents (Since Last Contact): Does not apply  Reasons for Ideation (Since Last Contact): Does not apply  Actual Attempt (Since Last Contact): No  Has subject engaged in non-suicidal self-injurious behavior? (Since Last Contact): No  Interrupted Attempts (Since Last Contact): No  Aborted or Self-Interrupted Attempt  (Since Last Contact): No  Preparatory Acts or Behavior (Since Last Contact): No  Suicide (Since Last Contact): No     Calculated C-SSRS Risk Score (Since Last Contact): Moderate Risk    Plan: Final Disposition / Recommended Care Path: discharge  Plan for Care reviewed with assigned Medical Provider: yes  Plan for Care Team Review: provider  Comments: Dr. Pedraza  Patient and/or validated legal guardian concurs: yes    Clinical Substantiation: It is the recommendation of this writer that the Pt discharge back to the community. Pt voiced a desire to go home. Pt states he has had 20+ years of mental health concerns and no luck with medications or treatment in relieving any of his symptoms and is feeling frustrated. Pt endorsed current SI severity at a 1 or 2 out of 5, but denied any plan or intent to act on thoughts. Pt has chronic SI at a 1 or 2 out of 5 at baseline. Pt does not have access to firearms or medications that he believes would be able to aid in any suicide attempt. Writer and Pt were able to engage in safety planning and lethal means counseling. Pt appeared insightful, honest, and open with writer during conversation as observed by open body posture and consistent eye contact. Pt is open to trying ECT therapy and writer provided resources and information for Pt. Pt is scheduled with follow up appointments from previous stay at [facility] on 2/12. Based on the Pt's current presentation and overall level of functioning discharge home is the least restrictive level of care.    Legal Status: Legal Status: Voluntary/Patient has signed consent for treatment    Session Status: Time session started: 0752  Time session ended: 0818  Session Duration (minutes): 26 minutes  Session Number: 2  Anticipated number of sessions or this episode of care: 2    Session Start Time: 0752  Session Stop Time: 0818  CPT codes: 72575 - Psychotherapy (with patient) - 30 (16-37*) min  Time Spent: 26 minutes      CPT code(s) utilized:  69541 - Psychotherapy (with patient) - 30 (16-37*) min    Diagnosis:   Patient Active Problem List   Diagnosis Code    Severe episode of recurrent major depressive disorder, without psychotic features (H) F33.2    Suicidal ideation R45.851       Primary Problem This Admission: Active Hospital Problems    Suicidal ideation      Severe episode of recurrent major depressive disorder, without psychotic features (H)        Bri Schumacher  MSW Intern

## 2025-02-18 NOTE — PROGRESS NOTES
"Pt appeared to be seeping through the NOC, writer checked in with him when he woke this AM and he reports sleep was fair. \"I would just like to get out of here.\"  Dicussed plan was to remain here and go Inpt, he agreed but was hoping to go soon. Process explained to pt, he verbalized understanding but wants to speak with the Provider and LMHP this AM. He is resting calm with his eyes closed.   "

## 2025-02-18 NOTE — TELEPHONE ENCOUNTER
NEEDS PLACEMENT THAT OFFERS ECT [place].     R: [place] [facility] Inpatient Bed Call Log 2/18/2025 8:16:25 AM  Intake has called facilities that have not updated the bed status within the last 12 hours.         (Adults);        METRO:                [facility] is posting 0 beds.             [facility] is posting 3 beds. [phone]: 8:27 AM CB AFTER 5:00 PM. NO CURRENT AVAILABILITY.  [facility] is posting 0 beds. Negative covid required.   [facility] is posting 0 beds. Neg covid. No high school/[facility]-psych. [phone] 8:27 AM PER MARISELA CB AFTER 9:00 AM. DOES NOT OFFER ECT TX  [place] is posting 0 beds. [phone]  [facility] is posting 0 bed. [phone] 8:24 AM PER [place], AT [facility].  [facility] is posting 6 beds. (Ages 18-35) Negative covid. [phone] PT NOT AGE APPROPRIATE.   [facility] is posting 0 beds.    [facility] is posting 0 beds [phone]         Pt remains on the work list pending appropriate bed availability.

## 2025-02-18 NOTE — PROGRESS NOTES
"Triage and Transition Services Extended Care Reassessment     Patient: Jatin goes by \"Jatin,\" uses he/him pronouns  Date of Service: February 18, 2025  Site of Service: [facility] Emergency Dept                             [id number]  Patient was seen    Mode of Assessment:       Reason for Reassessment: suicidal ideation, depression    History of Patient's Original Emergency Room Encounter: Pt was recently seen at [facility] about 5 days ago (2/12/25). Pt reports one brief [facility] stay, but discharged after 48 hours due to fear of cost for hospitalization. Pt was recommended IOP/PHP, but experienced significant challenges with following through on this recommendation, which further exacerbated his MH symptoms. Pt reports current MH diagnoses include, PDD, VELMA, and borderline personality disorder. Pt also endorses a remote history of drug addiction, noting he struggled with opioid addiction, but has been sober for over 10 years. Pt currently does not have outpatient resources or providers, but notes he has trialed many medications with limited benefits; Pt has also previously tried DBT and TMS, with no positive benefits. Pt reports strong social supports with family and friends.    Current Patient Presentation: Calm, cooperative, engaged    Presentation Summary: Pt was sitting in chair when approached by writer and was agreeable to meet. Pt reports feeling \"fine\" and \"slept okay\". Pt vocalized feeling frustrated and hopeless/helpless after 20+ years of mental health struggles and attempted treatment. Pt states that he has tried multiple medications and therapies and none of them have been successful in treating his depression. Pt expressed feeling like his executive functioning has been impacted by his mental health and that has made remembering and writing down appointments difficult. When asked about his SI Pt reported severity at 1 or 2 out of 5. Pt denies any current plan or intent to act on SI. Pt said " Nutrition-Related Diabetes Education      Time Spent: 6 minutes    Learners: Pt     Current HbA1c: 12.0 on 12/22    Is patient aware of their A1c and their goal A1c?       ___x____ yes    Home diabetes medication(s):-     Nutrition Education with handouts:   RD discussed how counting carbohydrates can help control blood glucose levels, and which foods contain carbohydrates such as breads, fruits, and what a starchy vegetable is. The importance of consuming fiber, protein, and healthy fat along with carbohydrates to help regulate blood sugar levels.     Comments: Spoke w/ pt at bedside, discussed management/portion controlling of carbohydrate for better blood sugar management. Pt fell asleep FDC through the session. Handouts were provided, pt had no additional questions for me today. RD following.     Barriers to Learning: non-compliance.    Follow up: Yes     Please consult as needed.  Thank you!   "\"If I was going to do it I think I would have done it already\" referring to completing suicide. Pt believes that SI and expressing intent and plan is a coping skill for him when he feels overwhelmed. Pt confirms baseline SI at 1 or 2 out of 5. Pt denies AH/VH/HI.    Changes Observed Since Initial Assessment: patient/family request    Therapeutic Interventions Provided: Engaged in guided discovery, explored patient's perspectives and helped expand them through socratic dialogue.    Current Symptoms:  (None reported) thoughts of death/suicide, helplessness, hopelessness, difficulty concentrating, crying or feels like crying  (None reported)  (None reported)  (None reported)    Mental Status Exam   Affect: Appropriate  Appearance: Appropriate  Attention Span/Concentration: Attentive  Eye Contact: Engaged    Fund of Knowledge: Appropriate   Language /Speech Content: Fluent  Language /Speech Volume: Normal  Language /Speech Rate/Productions: Normal  Recent Memory: Intact  Remote Memory: Intact  Mood: Normal  Orientation to Person: Yes   Orientation to Place: Yes  Orientation to Time of Day: Yes  Orientation to Date: Yes     Situation (Do they understand why they are here?): Yes  Psychomotor Behavior: Normal  Thought Content: Clear  Thought Form: Intact    Treatment Objective(s) Addressed: rapport building, identifying and practicing coping strategies, safety planning, assessing safety, Lethal means Counseling    Patient Response to Interventions: acceptance expressed, verbalizes understanding    Progress Towards Goals:  Patient Reports Symptoms Are: ongoing  Patient Progress Toward Goals: is making progress  Comment: Pt feels like 20+ years of medications and therapies for his depression have been unhelpful. However, believes that SI and expressing intent and plan is a coping skill for him when he feels overwhelmed.  Next Step to Work Toward Discharge: means restriction, patient ability to engage in safety " planning  Ability to Engage Comment: Pt was able to engage in safety planning and lethal means counseling with writer.  Means Restriction: Pt was able to engage in safety planning and lethal means counseling with writer. Pt does not have access to firearms, Pt denies having enough medications to overdose on. Writer offered Pt a lockbox to which he denied. Pt lives in a condo and does not have a garage that would allow him to utilize carbon dioxide poisoning as a means of suicide.    Case Management:      C-SSRS Since Last Contact:   1. Wish to be Dead (Since Last Contact): Yes  2. Non-Specific Active Suicidal Thoughts (Since Last Contact): Yes  3. Active Suicidal Ideation with any Methods (Not Plan) Without Intent to Act (Since Last Contact): Yes  4. Active Suicidal Ideation with Some Intent to Act, Without Specific Plan (Since Last Contact): No  5. Active Suicidal Ideation with Specific Plan and Intent (Since Last Contact): No  Most Severe Ideation Rating (Since Last Contact): 2  Frequency (Since Last Contact): Daily or almost daily  Duration (Since Last Contact): Less than 1 hour/some of the time  Controllability (Since Last Contact): Can control thoughts with little difficulty  Deterrents (Since Last Contact): Does not apply  Reasons for Ideation (Since Last Contact): Does not apply  Actual Attempt (Since Last Contact): No  Has subject engaged in non-suicidal self-injurious behavior? (Since Last Contact): No  Interrupted Attempts (Since Last Contact): No  Aborted or Self-Interrupted Attempt (Since Last Contact): No  Preparatory Acts or Behavior (Since Last Contact): No  Suicide (Since Last Contact): No     Calculated C-SSRS Risk Score (Since Last Contact): Moderate Risk    Plan: Final Disposition / Recommended Care Path: discharge  Plan for Care reviewed with assigned Medical Provider: yes  Plan for Care Team Review: provider  Comments: Dr. Pedraza  Patient and/or validated legal guardian concurs:  yes    Clinical Substantiation: It is the recommendation of this writer that the Pt discharge back to the community. Pt voiced a desire to go home. Pt states he has had 20+ years of mental health concerns and no luck with medications or treatment in relieving any of his symptoms and is feeling frustrated. Pt endorsed current SI severity at a 1 or 2 out of 5, but denied any plan or intent to act on thoughts. Pt has chronic SI at a 1 or 2 out of 5 at baseline. Pt does not have access to firearms or medications that he believes would be able to aid in any suicide attempt. Writer and Pt were able to engage in safety planning and lethal means counseling. Pt appeared insightful, honest, and open with writer during conversation as observed by open body posture and consistent eye contact. Pt is open to trying ECT therapy and writer provided resources and information for Pt. Based on the Pt's current presentation and overall level of functioning discharge home is the least restrictive level of care.    Legal Status: Legal Status: Voluntary/Patient has signed consent for treatment    Session Status: Time session started: 0752  Time session ended: 0818  Session Duration (minutes): 26 minutes  Session Number: 2  Anticipated number of sessions or this episode of care: 2    Session Start Time: 0752  Session Stop Time: 0818  CPT codes: 77115 - Psychotherapy (with patient) - 30 (16-37*) min  Time Spent: 26 minutes      CPT code(s) utilized: 17138 - Psychotherapy (with patient) - 30 (16-37*) min    Diagnosis:   Patient Active Problem List   Diagnosis Code    Severe episode of recurrent major depressive disorder, without psychotic features (H) F33.2    Suicidal ideation R45.851       Primary Problem This Admission: Active Hospital Problems    Suicidal ideation      Severe episode of recurrent major depressive disorder, without psychotic features (H)        Bri Schumacher  MSW Intern

## 2025-02-18 NOTE — ED NOTES
7:10 am I received report on this patient and will Take over care.9:54 am patient took am medications with no issues  no issues and uses soft voice and can voice her needs.He relaxing in main lounge he has no request at this time and can voice his needs.He denies ant SI/HI/SIB He interacts well with staff and has good eye contact.12:56 pm patient ready for discharge and all personal items returned to him.He drove himself and will drive his own car home he is alert x 4 and speech is clear and has good eye contact when interacting.All paperwork was printed out copy given to him. AVS given and went over with patient.D/C time 1:32 pm Denies any SI/HI SIB at time of discharge.

## 2025-02-20 ENCOUNTER — TELEPHONE (OUTPATIENT)
Dept: BEHAVIORAL HEALTH | Facility: CLINIC | Age: 45
End: 2025-02-20
Payer: COMMERCIAL

## 2025-02-20 NOTE — TELEPHONE ENCOUNTER
Spoke with PT regarding follow-up. Per PT request cancelled MM appointment on 2/26/25 with [facility]. No other questions/needs at this time. Follow-up complete.

## 2025-02-24 ENCOUNTER — PRE VISIT (OUTPATIENT)
Dept: UROLOGY | Facility: CLINIC | Age: 45
End: 2025-02-24
Payer: COMMERCIAL

## 2025-02-24 NOTE — TELEPHONE ENCOUNTER
Reason for visit: New anorgasmia    Records/imaging/labs/orders: All records available    At Rooming:  Standard rooming      Hi Killian  2/24/2025  2:14 PM

## 2025-03-06 ENCOUNTER — PRE VISIT (OUTPATIENT)
Dept: UROLOGY | Facility: CLINIC | Age: 45
End: 2025-03-06

## 2025-05-28 ENCOUNTER — OFFICE VISIT (OUTPATIENT)
Dept: FAMILY MEDICINE | Facility: CLINIC | Age: 45
End: 2025-05-28

## 2025-05-28 VITALS
HEART RATE: 66 BPM | WEIGHT: 205 LBS | OXYGEN SATURATION: 99 % | DIASTOLIC BLOOD PRESSURE: 80 MMHG | BODY MASS INDEX: 27.77 KG/M2 | HEIGHT: 72 IN | SYSTOLIC BLOOD PRESSURE: 113 MMHG

## 2025-05-28 DIAGNOSIS — Z82.3 FAMILY HISTORY OF STROKE: ICD-10-CM

## 2025-05-28 DIAGNOSIS — Z72.52 HIGH RISK HOMOSEXUAL BEHAVIOR: Primary | ICD-10-CM

## 2025-05-28 LAB
CHOLESTEROL: 153 MG/DL (ref 100–199)
FASTING?: NO
HDL (RMG): 44 MG/DL (ref 40–?)
LDL CALCULATED (RMG): 92 MG/DL (ref 0–130)
TRIGLYCERIDES (RMG): 87 MG/DL (ref 0–149)

## 2025-05-28 PROCEDURE — 87591 N.GONORRHOEAE DNA AMP PROB: CPT | Mod: ORL

## 2025-05-28 PROCEDURE — 80061 LIPID PANEL: CPT | Mod: QW

## 2025-05-28 PROCEDURE — 83695 ASSAY OF LIPOPROTEIN(A): CPT | Mod: ORL

## 2025-05-28 PROCEDURE — 36415 COLL VENOUS BLD VENIPUNCTURE: CPT

## 2025-05-28 PROCEDURE — G2211 COMPLEX E/M VISIT ADD ON: HCPCS

## 2025-05-28 PROCEDURE — 86780 TREPONEMA PALLIDUM: CPT | Mod: ORL

## 2025-05-28 PROCEDURE — 99203 OFFICE O/P NEW LOW 30 MIN: CPT

## 2025-05-28 PROCEDURE — 87389 HIV-1 AG W/HIV-1&-2 AB AG IA: CPT | Mod: ORL

## 2025-05-28 PROCEDURE — 3048F LDL-C <100 MG/DL: CPT

## 2025-05-28 PROCEDURE — 3079F DIAST BP 80-89 MM HG: CPT

## 2025-05-28 PROCEDURE — 3074F SYST BP LT 130 MM HG: CPT

## 2025-05-28 RX ORDER — EMTRICITABINE AND TENOFOVIR DISOPROXIL FUMARATE 200; 300 MG/1; MG/1
1 TABLET, FILM COATED ORAL DAILY
Qty: 90 TABLET | Refills: 0 | Status: SHIPPED | OUTPATIENT
Start: 2025-05-28 | End: 2025-05-29

## 2025-05-28 ASSESSMENT — PATIENT HEALTH QUESTIONNAIRE - PHQ9
SUM OF ALL RESPONSES TO PHQ QUESTIONS 1-9: 21
10. IF YOU CHECKED OFF ANY PROBLEMS, HOW DIFFICULT HAVE THESE PROBLEMS MADE IT FOR YOU TO DO YOUR WORK, TAKE CARE OF THINGS AT HOME, OR GET ALONG WITH OTHER PEOPLE: VERY DIFFICULT
SUM OF ALL RESPONSES TO PHQ QUESTIONS 1-9: 21

## 2025-05-28 NOTE — PROGRESS NOTES
Assessment & Plan     High risk homosexual behavior  - safe sex practices discussed briefly, he can alternate for labs and then labs with visit every 3months, does physical annually   - HIV Antigen Antibody Combo Cascade  - VENOUS COLLECTION  - emtricitabine-tenofovir (TRUVADA) 200-300 MG per tablet  Dispense: 90 tablet; Refill: 0  - Chlamydia trachomatis/Neisseria gonorrhoeae by PCR  - Treponema Abs w Reflex to RPR and Titer  - HIV Antigen Antibody Combo Cascade  - Treponema Abs w Reflex to RPR and Titer  - Chlamydia trachomatis/Neisseria gonorrhoeae by PCR    Family history of stroke  - Lipoprotein (a)  - Lipid Profile ([facility])  - Lipoprotein (a)            BMI  Estimated body mass index is 27.8 kg/m  as calculated from the following:    Height as of this encounter: 1.829 m (6').    Weight as of this encounter: 93 kg (205 lb).         Follow-up  No follow-ups on file.    Carol Steiner is a 44 year old, presenting for the following health issues:  Med Check (Refill truvada) and Establish Care (Looking for new clinic, his last PCP is no longer in network. /Previously seen at [facility] (Records available in EPIC))    History of Present Illness       Reason for visit:  Prep   He is taking medications regularly.          1.) PrEP consult:  currently taking Truvada.  Most recent labs completed on 90 days at previous clinic.  Recent ER visit for suicidal ideation, patient said this has been longstanding issue, denies SI/HI.      Has been on this for 10 years.  Unable to achieve orgasms.    At least 10 years - opioid and benzo addiction.  ADHD anxiety and depression.     Sex partner/s:  partners with penis, more than 1 partner   Oral/anal:  both, penetrative partner   Consistent condom use: inconsistent         Test Q 3 months Q 6 months Q 12 months When stopping PrEP   HIV Test            X*               X*   Renal Function   BUN/CR/GFR  If age < 50 and eGFR < 90 ml/min at PrEP initiation If age < 50  and eGFR > 90 ml/min at PrEP initiation              X   Syphilis    MSM/TGW             X     MSM/TGW   Gonorrhea    MSM/TGW             X     MSM/TGW   Chlamydia    MSM/TGW             X     MSM/TGW   Hep B Serology       Hep C Serology   MSM, TGW, PWID only        Review of Systems  Constitutional, HEENT, cardiovascular, pulmonary, gi and gu systems are negative, except as otherwise noted.      Objective    /80   Pulse 84   Ht 1.829 m (6')   Wt 93 kg (205 lb)   SpO2 98%   BMI 27.80 kg/m    Body mass index is 27.8 kg/m .  Physical Exam   GENERAL: alert and no distress  RESP: lungs clear to auscultation - no rales, rhonchi or wheezes  CV: regular rate and rhythm, normal S1 S2, no S3 or S4, no murmur, click or rub, no peripheral edema  MS: no gross musculoskeletal defects noted, no edema  PSYCH: mentation appears normal, affect normal/bright    Results for orders placed or performed in visit on 05/28/25 (from the past 24 hours)   Lipid Profile ([facility])   Result Value Ref Range    Cholesterol 153 100 - 199 mg/dL    HDL 44 >=40 mg/dL    Triglycerides 87 0 - 149 mg/dL    LDL CALCULATED ([facility]) 92 0 - 130 mg/dL    Patient Fasting? No          The longitudinal plan of care for the diagnosis(es)/condition(s) as documented were addressed during this visit. Due to the added complexity in care, I will continue to support Jatin in the subsequent management and with ongoing continuity of care.  Signed Electronically by: NIRMAL Rolle CNP

## 2025-05-29 ENCOUNTER — TELEPHONE (OUTPATIENT)
Dept: FAMILY MEDICINE | Facility: CLINIC | Age: 45
End: 2025-05-29

## 2025-05-29 DIAGNOSIS — Z72.52 HIGH RISK HOMOSEXUAL BEHAVIOR: ICD-10-CM

## 2025-05-29 LAB
C TRACH DNA SPEC QL PROBE+SIG AMP: NEGATIVE
HIV 1+2 AB+HIV1 P24 AG SERPL QL IA: NONREACTIVE
N GONORRHOEA DNA SPEC QL NAA+PROBE: NEGATIVE
SPECIMEN TYPE: NORMAL
T PALLIDUM AB SER QL: NONREACTIVE

## 2025-05-29 RX ORDER — EMTRICITABINE AND TENOFOVIR DISOPROXIL FUMARATE 200; 300 MG/1; MG/1
1 TABLET, FILM COATED ORAL DAILY
Qty: 30 TABLET | Refills: 2 | Status: SHIPPED | OUTPATIENT
Start: 2025-05-29

## 2025-05-29 NOTE — TELEPHONE ENCOUNTER
Jatin having a problem with trudada Rx that was send in.  Long story short.  The prescription has been deleted by them, they can not find it.  Started with going to the wrong [facility] special[facility] pharmacy and also needs to be 30 day not 90.  I am setting up another refill to the correct pharmacy.  Patient was able to get filled for a 30 day fill (he had 1 left from his previous provider)    truvada

## 2025-06-02 ENCOUNTER — RESULTS FOLLOW-UP (OUTPATIENT)
Dept: FAMILY MEDICINE | Facility: CLINIC | Age: 45
End: 2025-06-02

## 2025-06-02 LAB — APO A-I SERPL-MCNC: <6 MG/DL
